# Patient Record
Sex: FEMALE | Race: WHITE | NOT HISPANIC OR LATINO | Employment: UNEMPLOYED | URBAN - METROPOLITAN AREA
[De-identification: names, ages, dates, MRNs, and addresses within clinical notes are randomized per-mention and may not be internally consistent; named-entity substitution may affect disease eponyms.]

---

## 2018-03-25 ENCOUNTER — HOSPITAL ENCOUNTER (INPATIENT)
Facility: HOSPITAL | Age: 33
LOS: 13 days | Discharge: LEFT BEFORE DISCHARGE | DRG: 871 | End: 2018-04-07
Attending: EMERGENCY MEDICINE | Admitting: HOSPITALIST
Payer: MEDICAID

## 2018-03-25 DIAGNOSIS — R78.81 BACTEREMIA: ICD-10-CM

## 2018-03-25 DIAGNOSIS — R50.9 FEVER: ICD-10-CM

## 2018-03-25 DIAGNOSIS — I38 ENDOCARDITIS: ICD-10-CM

## 2018-03-25 DIAGNOSIS — R78.81 GRAM-POSITIVE BACTEREMIA: ICD-10-CM

## 2018-03-25 DIAGNOSIS — A41.9 SEPSIS: Primary | ICD-10-CM

## 2018-03-25 DIAGNOSIS — F19.10 IV DRUG ABUSE: ICD-10-CM

## 2018-03-25 DIAGNOSIS — R50.9 FEVER, UNSPECIFIED FEVER CAUSE: ICD-10-CM

## 2018-03-25 DIAGNOSIS — F19.10: ICD-10-CM

## 2018-03-25 PROBLEM — R07.9 CHEST PAIN: Status: ACTIVE | Noted: 2018-03-25

## 2018-03-25 PROBLEM — F19.90 ACTIVE INTRAVENOUS DRUG USE: Status: ACTIVE | Noted: 2018-03-25

## 2018-03-25 PROBLEM — B19.20 HEPATITIS C: Status: ACTIVE | Noted: 2018-03-25

## 2018-03-25 LAB
ALBUMIN SERPL BCP-MCNC: 3.5 G/DL
ALLENS TEST: ABNORMAL
ALP SERPL-CCNC: 66 U/L
ALT SERPL W/O P-5'-P-CCNC: 21 U/L
AMPHET+METHAMPHET UR QL: NEGATIVE
ANION GAP SERPL CALC-SCNC: 11 MMOL/L
AST SERPL-CCNC: 23 U/L
B-HCG UR QL: NEGATIVE
B-HCG UR QL: NEGATIVE
BACTERIA #/AREA URNS AUTO: ABNORMAL /HPF
BARBITURATES UR QL SCN>200 NG/ML: NEGATIVE
BASOPHILS # BLD AUTO: 0.03 K/UL
BASOPHILS NFR BLD: 0.2 %
BENZODIAZ UR QL SCN>200 NG/ML: NEGATIVE
BILIRUB SERPL-MCNC: 0.9 MG/DL
BILIRUB UR QL STRIP: NEGATIVE
BUN SERPL-MCNC: 7 MG/DL
BZE UR QL SCN: NORMAL
CALCIUM SERPL-MCNC: 9.2 MG/DL
CANNABINOIDS UR QL SCN: NEGATIVE
CHLORIDE SERPL-SCNC: 97 MMOL/L
CLARITY UR REFRACT.AUTO: ABNORMAL
CO2 SERPL-SCNC: 23 MMOL/L
COLOR UR AUTO: YELLOW
CREAT SERPL-MCNC: 0.7 MG/DL
CREAT UR-MCNC: 96 MG/DL
CTP QC/QA: YES
CTP QC/QA: YES
DIASTOLIC DYSFUNCTION: NO
DIFFERENTIAL METHOD: ABNORMAL
EOSINOPHIL # BLD AUTO: 0 K/UL
EOSINOPHIL NFR BLD: 0 %
ERYTHROCYTE [DISTWIDTH] IN BLOOD BY AUTOMATED COUNT: 13.4 %
EST. GFR  (AFRICAN AMERICAN): >60 ML/MIN/1.73 M^2
EST. GFR  (NON AFRICAN AMERICAN): >60 ML/MIN/1.73 M^2
ESTIMATED PA SYSTOLIC PRESSURE: 24.16
FLUAV AG SPEC QL IA: NEGATIVE
FLUBV AG SPEC QL IA: NEGATIVE
GLUCOSE SERPL-MCNC: 116 MG/DL
GLUCOSE UR QL STRIP: NEGATIVE
HCO3 UR-SCNC: 29.5 MMOL/L (ref 24–28)
HCT VFR BLD AUTO: 36.8 %
HGB BLD-MCNC: 12.1 G/DL
HGB UR QL STRIP: NEGATIVE
IMM GRANULOCYTES # BLD AUTO: 0.11 K/UL
IMM GRANULOCYTES NFR BLD AUTO: 0.8 %
INR PPP: 1.1
KETONES UR QL STRIP: NEGATIVE
LACTATE SERPL-SCNC: 0.9 MMOL/L
LACTATE SERPL-SCNC: 0.9 MMOL/L
LDH SERPL L TO P-CCNC: 0.71 MMOL/L (ref 0.5–2.2)
LEUKOCYTE ESTERASE UR QL STRIP: NEGATIVE
LYMPHOCYTES # BLD AUTO: 0.8 K/UL
LYMPHOCYTES NFR BLD: 6.1 %
MCH RBC QN AUTO: 27.8 PG
MCHC RBC AUTO-ENTMCNC: 32.9 G/DL
MCV RBC AUTO: 84 FL
METHADONE UR QL SCN>300 NG/ML: NEGATIVE
MICROSCOPIC COMMENT: ABNORMAL
MONOCYTES # BLD AUTO: 0.5 K/UL
MONOCYTES NFR BLD: 3.9 %
NEUTROPHILS # BLD AUTO: 12.2 K/UL
NEUTROPHILS NFR BLD: 89 %
NITRITE UR QL STRIP: NEGATIVE
NRBC BLD-RTO: 0 /100 WBC
OPIATES UR QL SCN: NEGATIVE
PCO2 BLDA: 39.5 MMHG (ref 35–45)
PCP UR QL SCN>25 NG/ML: NEGATIVE
PH SMN: 7.48 [PH] (ref 7.35–7.45)
PH UR STRIP: 5 [PH] (ref 5–8)
PLATELET # BLD AUTO: 278 K/UL
PMV BLD AUTO: 9.4 FL
PO2 BLDA: 46 MMHG (ref 40–60)
POC BE: 6 MMOL/L
POC SATURATED O2: 85 % (ref 95–100)
POC TCO2: 31 MMOL/L (ref 24–29)
POTASSIUM SERPL-SCNC: 4.3 MMOL/L
PROCALCITONIN SERPL IA-MCNC: 17.87 NG/ML
PROT SERPL-MCNC: 8 G/DL
PROT UR QL STRIP: NEGATIVE
PROTHROMBIN TIME: 11.8 SEC
RBC # BLD AUTO: 4.36 M/UL
RBC #/AREA URNS AUTO: 3 /HPF (ref 0–4)
RETIRED EF AND QEF - SEE NOTES: 65 (ref 55–65)
SAMPLE: ABNORMAL
SITE: ABNORMAL
SODIUM SERPL-SCNC: 131 MMOL/L
SP GR UR STRIP: 1.01 (ref 1–1.03)
SPECIMEN SOURCE: NORMAL
SQUAMOUS #/AREA URNS AUTO: 27 /HPF
TOXICOLOGY INFORMATION: NORMAL
TRICUSPID VALVE REGURGITATION: NORMAL
URN SPEC COLLECT METH UR: ABNORMAL
UROBILINOGEN UR STRIP-ACNC: NEGATIVE EU/DL
WBC # BLD AUTO: 13.71 K/UL
WBC #/AREA URNS AUTO: 2 /HPF (ref 0–5)

## 2018-03-25 PROCEDURE — 83605 ASSAY OF LACTIC ACID: CPT

## 2018-03-25 PROCEDURE — 85610 PROTHROMBIN TIME: CPT

## 2018-03-25 PROCEDURE — 93010 ELECTROCARDIOGRAM REPORT: CPT | Mod: ,,, | Performed by: INTERNAL MEDICINE

## 2018-03-25 PROCEDURE — 85025 COMPLETE CBC W/AUTO DIFF WBC: CPT

## 2018-03-25 PROCEDURE — 93306 TTE W/DOPPLER COMPLETE: CPT

## 2018-03-25 PROCEDURE — 96368 THER/DIAG CONCURRENT INF: CPT

## 2018-03-25 PROCEDURE — 96366 THER/PROPH/DIAG IV INF ADDON: CPT

## 2018-03-25 PROCEDURE — 86703 HIV-1/HIV-2 1 RESULT ANTBDY: CPT

## 2018-03-25 PROCEDURE — 99291 CRITICAL CARE FIRST HOUR: CPT | Mod: ,,, | Performed by: EMERGENCY MEDICINE

## 2018-03-25 PROCEDURE — 86803 HEPATITIS C AB TEST: CPT

## 2018-03-25 PROCEDURE — 87147 CULTURE TYPE IMMUNOLOGIC: CPT | Mod: 59

## 2018-03-25 PROCEDURE — 87077 CULTURE AEROBIC IDENTIFY: CPT

## 2018-03-25 PROCEDURE — 99291 CRITICAL CARE FIRST HOUR: CPT | Mod: 25

## 2018-03-25 PROCEDURE — 86705 HEP B CORE ANTIBODY IGM: CPT

## 2018-03-25 PROCEDURE — 87400 INFLUENZA A/B EACH AG IA: CPT | Mod: 59

## 2018-03-25 PROCEDURE — 81025 URINE PREGNANCY TEST: CPT | Performed by: PHYSICIAN ASSISTANT

## 2018-03-25 PROCEDURE — 96365 THER/PROPH/DIAG IV INF INIT: CPT

## 2018-03-25 PROCEDURE — 93306 TTE W/DOPPLER COMPLETE: CPT | Mod: 26,,, | Performed by: INTERNAL MEDICINE

## 2018-03-25 PROCEDURE — 99232 SBSQ HOSP IP/OBS MODERATE 35: CPT | Mod: ,,, | Performed by: HOSPITALIST

## 2018-03-25 PROCEDURE — 84145 PROCALCITONIN (PCT): CPT

## 2018-03-25 PROCEDURE — 11000001 HC ACUTE MED/SURG PRIVATE ROOM

## 2018-03-25 PROCEDURE — 96375 TX/PRO/DX INJ NEW DRUG ADDON: CPT

## 2018-03-25 PROCEDURE — 25000003 PHARM REV CODE 250: Performed by: STUDENT IN AN ORGANIZED HEALTH CARE EDUCATION/TRAINING PROGRAM

## 2018-03-25 PROCEDURE — 63600175 PHARM REV CODE 636 W HCPCS: Performed by: PHYSICIAN ASSISTANT

## 2018-03-25 PROCEDURE — 87186 SC STD MICRODIL/AGAR DIL: CPT | Mod: 59

## 2018-03-25 PROCEDURE — 80053 COMPREHEN METABOLIC PANEL: CPT

## 2018-03-25 PROCEDURE — 25000003 PHARM REV CODE 250: Performed by: PHYSICIAN ASSISTANT

## 2018-03-25 PROCEDURE — 80307 DRUG TEST PRSMV CHEM ANLYZR: CPT

## 2018-03-25 PROCEDURE — 87040 BLOOD CULTURE FOR BACTERIA: CPT | Mod: 59

## 2018-03-25 PROCEDURE — 81001 URINALYSIS AUTO W/SCOPE: CPT

## 2018-03-25 PROCEDURE — 86706 HEP B SURFACE ANTIBODY: CPT

## 2018-03-25 PROCEDURE — 93005 ELECTROCARDIOGRAM TRACING: CPT

## 2018-03-25 PROCEDURE — 25000003 PHARM REV CODE 250: Performed by: FAMILY MEDICINE

## 2018-03-25 PROCEDURE — 82803 BLOOD GASES ANY COMBINATION: CPT

## 2018-03-25 PROCEDURE — 87522 HEPATITIS C REVRS TRNSCRPJ: CPT

## 2018-03-25 PROCEDURE — 96361 HYDRATE IV INFUSION ADD-ON: CPT

## 2018-03-25 RX ORDER — FENTANYL CITRATE 50 UG/ML
50 INJECTION, SOLUTION INTRAMUSCULAR; INTRAVENOUS
Status: COMPLETED | OUTPATIENT
Start: 2018-03-25 | End: 2018-03-25

## 2018-03-25 RX ORDER — CLONIDINE HYDROCHLORIDE 0.1 MG/1
0.1 TABLET ORAL EVERY 4 HOURS PRN
Status: DISCONTINUED | OUTPATIENT
Start: 2018-03-25 | End: 2018-04-07 | Stop reason: HOSPADM

## 2018-03-25 RX ORDER — VANCOMYCIN/0.9 % SOD CHLORIDE 1 G/100 ML
1000 PLASTIC BAG, INJECTION (ML) INTRAVENOUS EVERY 8 HOURS
Status: DISCONTINUED | OUTPATIENT
Start: 2018-03-26 | End: 2018-03-27

## 2018-03-25 RX ORDER — IBUPROFEN 400 MG/1
400 TABLET ORAL EVERY 6 HOURS PRN
Status: DISCONTINUED | OUTPATIENT
Start: 2018-03-25 | End: 2018-03-26

## 2018-03-25 RX ORDER — MUPIROCIN 20 MG/G
OINTMENT TOPICAL DAILY
Status: DISCONTINUED | OUTPATIENT
Start: 2018-03-26 | End: 2018-04-07 | Stop reason: HOSPADM

## 2018-03-25 RX ORDER — TIZANIDINE 4 MG/1
4 TABLET ORAL EVERY 8 HOURS PRN
Status: DISCONTINUED | OUTPATIENT
Start: 2018-03-25 | End: 2018-04-07 | Stop reason: HOSPADM

## 2018-03-25 RX ORDER — ONDANSETRON 2 MG/ML
8 INJECTION INTRAMUSCULAR; INTRAVENOUS EVERY 8 HOURS PRN
Status: DISCONTINUED | OUTPATIENT
Start: 2018-03-25 | End: 2018-03-27

## 2018-03-25 RX ORDER — ACETAMINOPHEN 325 MG/1
650 TABLET ORAL EVERY 6 HOURS PRN
Status: DISCONTINUED | OUTPATIENT
Start: 2018-03-25 | End: 2018-03-26

## 2018-03-25 RX ORDER — VANCOMYCIN 2 GRAM/500 ML IN 0.9 % SODIUM CHLORIDE INTRAVENOUS
2000
Status: COMPLETED | OUTPATIENT
Start: 2018-03-25 | End: 2018-03-25

## 2018-03-25 RX ORDER — ACETAMINOPHEN 500 MG
1000 TABLET ORAL
Status: COMPLETED | OUTPATIENT
Start: 2018-03-25 | End: 2018-03-25

## 2018-03-25 RX ORDER — KETOROLAC TROMETHAMINE 30 MG/ML
15 INJECTION, SOLUTION INTRAMUSCULAR; INTRAVENOUS
Status: COMPLETED | OUTPATIENT
Start: 2018-03-25 | End: 2018-03-25

## 2018-03-25 RX ORDER — LOPERAMIDE HYDROCHLORIDE 2 MG/1
2 CAPSULE ORAL 4 TIMES DAILY PRN
Status: DISCONTINUED | OUTPATIENT
Start: 2018-03-25 | End: 2018-04-07 | Stop reason: HOSPADM

## 2018-03-25 RX ADMIN — KETOROLAC TROMETHAMINE 15 MG: 30 INJECTION, SOLUTION INTRAMUSCULAR at 02:03

## 2018-03-25 RX ADMIN — SODIUM CHLORIDE 2040 ML: 0.9 INJECTION, SOLUTION INTRAVENOUS at 02:03

## 2018-03-25 RX ADMIN — PIPERACILLIN AND TAZOBACTAM 4.5 G: 4; .5 INJECTION, POWDER, LYOPHILIZED, FOR SOLUTION INTRAVENOUS; PARENTERAL at 03:03

## 2018-03-25 RX ADMIN — TIZANIDINE 4 MG: 2 TABLET ORAL at 10:03

## 2018-03-25 RX ADMIN — CLONIDINE HYDROCHLORIDE 0.1 MG: 0.1 TABLET ORAL at 10:03

## 2018-03-25 RX ADMIN — IBUPROFEN 400 MG: 400 TABLET, FILM COATED ORAL at 10:03

## 2018-03-25 RX ADMIN — FENTANYL CITRATE 50 MCG: 50 INJECTION, SOLUTION INTRAMUSCULAR; INTRAVENOUS at 02:03

## 2018-03-25 RX ADMIN — VANCOMYCIN HYDROCHLORIDE 2000 MG: 10 INJECTION, POWDER, LYOPHILIZED, FOR SOLUTION INTRAVENOUS at 04:03

## 2018-03-25 RX ADMIN — ACETAMINOPHEN 1000 MG: 500 TABLET ORAL at 02:03

## 2018-03-25 NOTE — HPI
33yoF with PMHx of reported Hep C and IVDU presents with 2 day hx of fevers with associated SOB, sharp reproducible chest pain radiating to back, myalgias, and one loose bowel movement. Patient claims she has had flu like symptoms before but this different. Patient claims her and her boyfriend live in a tent and he has similar symptoms. Patient says she has noticed areas of pus throughout her arms and has been picking at her face and says that seems infected. Patient denies nausea, vomiting, cough, or dysuria. Patient uses heroin daily and injects cocaine once weekly, last used heroin yesterday. Patient claims she learned she had Hep C from needle exchange clinic 6 mos ago and has never received treatment.

## 2018-03-25 NOTE — ED TRIAGE NOTES
Pt reports 103.0 fever today along with chest pain x2 days. Pt states she has been short of breath x1 day. Pt denies diaphoresis/cardiac history.

## 2018-03-25 NOTE — ED PROVIDER NOTES
Encounter Date: 3/25/2018       History     Chief Complaint   Patient presents with    Fever     fever, body aches, diarrhea, upper left chest wall swelling and tenderness, multiple lesions noted at different stages of healing to body.      32 y/o female with history of IV drug abuse x 6 years presents to the ER with chief complaint of fever and body aches x 2 days.  She reports chest and back pain, which is worse with deep breaths and movement.  She denies nausea, vomiting, but had 1 episode of loose brown stool yesterday. She has shortness of breath.  She has a frontal headache.  Her pain overall is rated 10/10.  She denies dysuria, urinary frequency, vaginal discharge, cough.  She took tylenol this morning, but has not taken anything in addition for her symptoms.    She last used IV heroin yesterday in her left arm.            Review of patient's allergies indicates:  No Known Allergies  History reviewed. No pertinent past medical history.  History reviewed. No pertinent surgical history.  History reviewed. No pertinent family history.  Social History   Substance Use Topics    Smoking status: Current Some Day Smoker     Packs/day: 0.50     Types: Cigarettes    Smokeless tobacco: Never Used    Alcohol use Not on file     Review of Systems   Constitutional: Positive for chills and fever.   HENT: Negative for sore throat.    Respiratory: Positive for shortness of breath. Negative for cough, chest tightness, wheezing and stridor.    Cardiovascular: Positive for chest pain. Negative for palpitations and leg swelling.   Gastrointestinal: Positive for diarrhea. Negative for abdominal pain, blood in stool, nausea and vomiting.   Genitourinary: Negative for dysuria.   Musculoskeletal: Positive for back pain and myalgias.   Skin: Positive for rash.   Neurological: Positive for headaches. Negative for dizziness, syncope, weakness and light-headedness.   Hematological: Does not bruise/bleed easily.    Psychiatric/Behavioral: Negative for confusion.       Physical Exam     Initial Vitals [03/25/18 1225]   BP Pulse Resp Temp SpO2   (!) 141/82 109 20 (!) 103.5 °F (39.7 °C) 99 %      MAP       101.67         Physical Exam    Nursing note and vitals reviewed.  Constitutional: She appears well-developed and well-nourished. She appears distressed (patient appears uncomfortable).   HENT:   Head: Atraumatic.   Mouth/Throat: Oropharynx is clear and moist.   Eyes: Conjunctivae and EOM are normal. Pupils are equal, round, and reactive to light.   Neck: Normal range of motion. Neck supple.   Cardiovascular: Normal rate, regular rhythm and intact distal pulses.   Pulmonary/Chest: Breath sounds normal. No respiratory distress. She has no wheezes. She has no rhonchi. She has no rales.   Abdominal: Soft. Bowel sounds are normal. There is no tenderness.   Neurological: She is alert and oriented to person, place, and time.   Skin: Capillary refill takes less than 2 seconds.   Multiple track marks and small skin pustules and sores involving bilateral arms.   Psychiatric: She has a normal mood and affect.         ED Course   Procedures  Labs Reviewed   CBC W/ AUTO DIFFERENTIAL - Abnormal; Notable for the following:        Result Value    WBC 13.71 (*)     Hematocrit 36.8 (*)     Immature Granulocytes 0.8 (*)     Gran # (ANC) 12.2 (*)     Immature Grans (Abs) 0.11 (*)     Lymph # 0.8 (*)     Gran% 89.0 (*)     Lymph% 6.1 (*)     Mono% 3.9 (*)     All other components within normal limits   COMPREHENSIVE METABOLIC PANEL - Abnormal; Notable for the following:     Sodium 131 (*)     Glucose 116 (*)     All other components within normal limits   PROCALCITONIN - Abnormal; Notable for the following:     Procalcitonin 17.87 (*)     All other components within normal limits   ISTAT PROCEDURE - Abnormal; Notable for the following:     POC PH 7.481 (*)     POC HCO3 29.5 (*)     POC SATURATED O2 85 (*)     POC TCO2 31 (*)     All other  components within normal limits   CULTURE, BLOOD   CULTURE, BLOOD   LACTIC ACID, PLASMA   INFLUENZA A AND B ANTIGEN   PROTIME-INR   URINALYSIS, REFLEX TO URINE CULTURE   LACTIC ACID, PLASMA   POCT URINE PREGNANCY                   APC / Resident Notes:   Patient presents to the ER with c/o fever,  body aches, chest pain and shortness of breath.  Patient is initially 103.5 temp and pulse of 109.  Sepsis protocol was initiated in the intake area and the patient was transferred to the main ED.  IV fluids, Tylenol and Toradol are given.  She is also given 1 dose of fentanyl in the ED.  On exam the patient has multiple track marks and skin lesions.  There is no obvious abscess or large area of cellulitis.  We have considered MRSA as the source of her infection.  She will also likely require LIZET to evaluate for possible endocarditis.    Patient is given IV vancomycin and zosyn in the ED.   Labs reveal WBC count 13.71, procalcitonin elevated to 17.87, normal lactic acid.    She will be admitted for continued treatment of sepsis.  Blood cultures are pending.    Patient is in agreement with plan for admission.       I discussed the care of this pt with my supervising MD and the patient was also seen by her.             Attending Attestation:             Attending ED Notes:   I have reviewed the case with my KENDRICK and agree with the history, review of systems, physical exam, assessment and plan of care as documented by my advance practice clinician.  ROS as above and as addressed on KENDRICK documentation.   All other systems reviewed and are negative.    PHYSICAL EXAM  Vital signs and nurse note reviewed.  Febrile    GEN:  NAD, sleeping but arousable, oriented ×3, atraumatic, well appearing, nontoxic  SKIN:               Warm, dry, multiple esposito crusting lesions on mouth, trunk and upper extremities  HEENT:   PERRLA, EOMI, nl conjunciva, no node/nodule, neck supple, no rigidity, dry membranes  CV:   2+ radial pulses, <2 sec cap  refill, no obvious JVD  CHEST/Resp: equal and bilateral chest rise, no obvious wheezing  ABD:     Soft, nondistended  EXT:   JEAN x 4, FROM, no swelling  NEURO:  CN II-XII grossly intact, no obvious motor/sensory deficit  PSYCH:  no SI/HI, no anxiety, nl mood/affect    Labs and imaging studies reviewed and independently Interpreted: Leukocytosis, elevated pro calcitonin, unremarkable BMP, unremarkable lactic acid, unremarkable urinary pregnancy test.  Chest x-ray with no acute opacities.    The results and physical exam findings were reviewed with the patient. Pt agrees with assessment, disposition and treatment plan and has no further questions or complaints at this time.    Critical care was necessary to treat or prevent imminent or life-threatening deterioration.   Critical care time: 31 min  Time spent at the bedside, reviewing test results, discussing the case with staff and/or consult(s), documenting the medical record and time spent with family members discussing treatment and management decisions.    The time involved in the performance of separately reportable procedures was not counted toward critical care time.               Clinical Impression:   The primary encounter diagnosis was Sepsis. Diagnoses of IV drug abuse, Track marks due to intravenous drug abuse, Fever, unspecified fever cause, Fever, and Endocarditis were also pertinent to this visit.    Disposition:   Disposition: Admitted  Condition: SURI Conde  03/25/18 6103

## 2018-03-25 NOTE — ASSESSMENT & PLAN NOTE
Reproducible on exam  EKG without STEMI or depressions  No other cardiac risk factors other than IV cocaine use intermittently; however denies use this week   Likely MSK, prn ibuprofen ordered

## 2018-03-25 NOTE — ASSESSMENT & PLAN NOTE
Patient everyday heroin user  Prn clonidine, loperamide, ibuprofen, and tizanidine for withdrawal symptoms  Likely consult addiction psych

## 2018-03-25 NOTE — SUBJECTIVE & OBJECTIVE
History reviewed. No pertinent past medical history.    History reviewed. No pertinent surgical history.    Review of patient's allergies indicates:  No Known Allergies    No current facility-administered medications on file prior to encounter.      No current outpatient prescriptions on file prior to encounter.     Family History     None        Social History Main Topics    Smoking status: Current Some Day Smoker     Packs/day: 0.50     Types: Cigarettes    Smokeless tobacco: Never Used    Alcohol use Not on file    Drug use: Yes     Frequency: 3.0 times per week     Types: IV      Comment: Heroin    Sexual activity: Not on file     Review of Systems   Constitutional: Positive for diaphoresis and fever.   Eyes: Negative for visual disturbance.   Respiratory: Positive for shortness of breath.    Cardiovascular: Positive for chest pain. Negative for palpitations and leg swelling.   Gastrointestinal: Positive for diarrhea. Negative for abdominal pain, nausea and vomiting.   Endocrine: Negative for polyuria.   Genitourinary: Negative for dysuria and hematuria.   Musculoskeletal: Positive for myalgias. Negative for arthralgias.   Skin: Positive for rash and wound.   Neurological: Positive for light-headedness. Negative for weakness, numbness and headaches.   Psychiatric/Behavioral: Negative for confusion.     Objective:     Vital Signs (Most Recent):  Temp: (!) 102.9 °F (39.4 °C) (03/25/18 1413)  Pulse: 89 (03/25/18 1601)  Resp: (!) 21 (03/25/18 1601)  BP: 115/60 (03/25/18 1601)  SpO2: 98 % (03/25/18 1601) Vital Signs (24h Range):  Temp:  [102.9 °F (39.4 °C)-103.5 °F (39.7 °C)] 102.9 °F (39.4 °C)  Pulse:  [] 89  Resp:  [18-21] 21  SpO2:  [98 %-99 %] 98 %  BP: (115-141)/(60-86) 115/60     Weight: 68 kg (150 lb)  Body mass index is 27.44 kg/m².    Physical Exam   Constitutional: She is oriented to person, place, and time. No distress.   HENT:   Mouth/Throat: Oropharynx is clear and moist.   Eyes: Conjunctivae  and EOM are normal. No scleral icterus.   Neck: Normal range of motion. No JVD present.   Cardiovascular: Normal rate, regular rhythm and normal heart sounds.    1/6 murmur LUSB   Pulmonary/Chest: Effort normal and breath sounds normal. She has no wheezes. She has no rales. She exhibits tenderness.   Abdominal: Soft. Bowel sounds are normal. She exhibits no distension. There is no tenderness.   Musculoskeletal: She exhibits no edema.   Neurological: She is alert and oriented to person, place, and time. No sensory deficit.   Skin: Skin is warm. She is diaphoretic.   Multiple pustule lesions on face, extremities and back. Face with scabbing effacing chin.   Psychiatric: She has a normal mood and affect.         CRANIAL NERVES     CN III, IV, VI   Extraocular motions are normal.        Significant Labs:   ABGs:   Recent Labs  Lab 03/25/18  1401   PH 7.481*   PCO2 39.5   HCO3 29.5*   POCSATURATED 85*   BE 6     BMP:   Recent Labs  Lab 03/25/18  1358   *   *   K 4.3   CL 97   CO2 23   BUN 7   CREATININE 0.7   CALCIUM 9.2     CBC:   Recent Labs  Lab 03/25/18  1358   WBC 13.71*   HGB 12.1   HCT 36.8*        Lactic Acid:   Recent Labs  Lab 03/25/18  1358   LACTATE 0.9     Procalcitonin 3/25   17.87    Significant Imaging: I have reviewed all pertinent imaging results/findings within the past 24 hours.

## 2018-03-25 NOTE — ED NOTES
Pt reassessed in triage: no change in pt status, AOx3, resp even/unlabored, skin w/d, generalized weakness,  plan of care discussed with patient and family to include: reassure and observe. No new complaints at this time,  NAD at this time, will continue to montior.

## 2018-03-25 NOTE — ASSESSMENT & PLAN NOTE
Patient with tachycardia and fever up to 103.5 in setting of IVDU also has multiple pustules on exam and facial lesion consistent with impetigo  Procal 17.89, leukocytosis 13.71  Ddx includes IE vs GPC bacteremia vs HIV  Currently CXR not consistent with PNA, influenza negative  Blood Cx x 2 drawn in the Ed, follow cultures  Given 30cc/kg zosyn and vanc in the ED  Rocephin and vanc on the floor, Bactroban for impetigo  TTE ordered  HIV testing ordered

## 2018-03-25 NOTE — H&P
[facility] Hospital Medicine  History & Physical    Patient Name: Kimberlee Ling  MRN: [id number]  Admission Date: 3/25/2018  Attending Physician: Kyrie Gonzalez MD   Primary Care Provider: [facility] Medicine Team: [facility] HOSP MED 4 Maribell Segundo MD     Patient information was obtained from patient and ER records.     Subjective:     Principal Problem:Sepsis    Chief Complaint:   Chief Complaint   Patient presents with    Fever     fever, body aches, diarrhea, upper left chest wall swelling and tenderness, multiple lesions noted at different stages of healing to body.         HPI: 33yoF with PMHx of reported Hep C and IVDU presents with 2 day hx of fevers with associated SOB, sharp reproducible chest pain radiating to back, myalgias, and one loose bowel movement. Patient claims she has had flu like symptoms before but this different. Patient claims her and her boyfriend live in a tent and he has similar symptoms. Patient says she has noticed areas of pus throughout her arms and has been picking at her face and says that seems infected. Patient denies nausea, vomiting, cough, or dysuria. Patient uses heroin daily and injects cocaine once weekly, last used heroin yesterday. Patient claims she learned she had Hep C from needle exchange clinic 6 mos ago and has never received treatment.    History reviewed. No pertinent past medical history.    History reviewed. No pertinent surgical history.    Review of patient's allergies indicates:  No Known Allergies    No current facility-administered medications on file prior to encounter.      No current outpatient prescriptions on file prior to encounter.     Family History     None        Social History Main Topics    Smoking status: Current Some Day Smoker     Packs/day: 0.50     Types: Cigarettes    Smokeless tobacco: Never Used    Alcohol use Not on file    Drug use: Yes     Frequency: 3.0 times per week     Types: IV       Comment: Heroin    Sexual activity: Not on file     Review of Systems   Constitutional: Positive for diaphoresis and fever.   Eyes: Negative for visual disturbance.   Respiratory: Positive for shortness of breath.    Cardiovascular: Positive for chest pain. Negative for palpitations and leg swelling.   Gastrointestinal: Positive for diarrhea. Negative for abdominal pain, nausea and vomiting.   Endocrine: Negative for polyuria.   Genitourinary: Negative for dysuria and hematuria.   Musculoskeletal: Positive for myalgias. Negative for arthralgias.   Skin: Positive for rash and wound.   Neurological: Positive for light-headedness. Negative for weakness, numbness and headaches.   Psychiatric/Behavioral: Negative for confusion.     Objective:     Vital Signs (Most Recent):  Temp: (!) 102.9 °F (39.4 °C) (03/25/18 1413)  Pulse: 89 (03/25/18 1601)  Resp: (!) 21 (03/25/18 1601)  BP: 115/60 (03/25/18 1601)  SpO2: 98 % (03/25/18 1601) Vital Signs (24h Range):  Temp:  [102.9 °F (39.4 °C)-103.5 °F (39.7 °C)] 102.9 °F (39.4 °C)  Pulse:  [] 89  Resp:  [18-21] 21  SpO2:  [98 %-99 %] 98 %  BP: (115-141)/(60-86) 115/60     Weight: 68 kg (150 lb)  Body mass index is 27.44 kg/m².    Physical Exam   Constitutional: She is oriented to person, place, and time. No distress.   HENT:   Mouth/Throat: Oropharynx is clear and moist.   Eyes: Conjunctivae and EOM are normal. No scleral icterus.   Neck: Normal range of motion. No JVD present.   Cardiovascular: Normal rate, regular rhythm and normal heart sounds.    1/6 murmur LUSB   Pulmonary/Chest: Effort normal and breath sounds normal. She has no wheezes. She has no rales. She exhibits tenderness.   Abdominal: Soft. Bowel sounds are normal. She exhibits no distension. There is no tenderness.   Musculoskeletal: She exhibits no edema.   Neurological: She is alert and oriented to person, place, and time. No sensory deficit.   Skin: Skin is warm. She is diaphoretic.   Multiple pustule  lesions on face, extremities and back. Face with scabbing effacing chin.   Psychiatric: She has a normal mood and affect.         CRANIAL NERVES     CN III, IV, VI   Extraocular motions are normal.        Significant Labs:   ABGs:   Recent Labs  Lab 03/25/18  1401   PH 7.481*   PCO2 39.5   HCO3 29.5*   POCSATURATED 85*   BE 6     BMP:   Recent Labs  Lab 03/25/18  1358   *   *   K 4.3   CL 97   CO2 23   BUN 7   CREATININE 0.7   CALCIUM 9.2     CBC:   Recent Labs  Lab 03/25/18  1358   WBC 13.71*   HGB 12.1   HCT 36.8*        Lactic Acid:   Recent Labs  Lab 03/25/18  1358   LACTATE 0.9     Procalcitonin 3/25   17.87    Significant Imaging: I have reviewed all pertinent imaging results/findings within the past 24 hours.    Assessment/Plan:     * Sepsis    Patient with tachycardia and fever up to 103.5 in setting of IVDU also has multiple pustules on exam and facial lesion consistent with impetigo  Procal 17.89, leukocytosis 13.71  Ddx includes IE vs GPC bacteremia vs HIV  Currently CXR not consistent with PNA, influenza negative  Blood Cx x 2 drawn in the Ed, follow cultures  Given 30cc/kg zosyn and vanc in the ED  Rocephin and vanc on the floor, Bactroban for impetigo  TTE ordered  HIV testing ordered         Active intravenous drug use    Patient everyday heroin user  Prn clonidine, loperamide, ibuprofen, and tizanidine for withdrawal symptoms  Likely consult addiction psych          Chest pain    Reproducible on exam  EKG without STEMI or depressions  No other cardiac risk factors other than IV cocaine use intermittently; however denies use this week   Likely MSK, prn ibuprofen ordered          Hepatitis C    Reported per patient who does not have medical care, patient discovered 6 mos ago from needle exchange service; not currently being treated  Will order chronic hepatitis labs including Hep C RNA            VTE Risk Mitigation     None             Maribell Segundo MD  Department of Hospital  Medicine   [facility]

## 2018-03-26 LAB
ALBUMIN SERPL BCP-MCNC: 2.7 G/DL
ALP SERPL-CCNC: 60 U/L
ALT SERPL W/O P-5'-P-CCNC: 17 U/L
ANION GAP SERPL CALC-SCNC: 7 MMOL/L
AST SERPL-CCNC: 19 U/L
BASOPHILS # BLD AUTO: 0.02 K/UL
BASOPHILS NFR BLD: 0.2 %
BILIRUB SERPL-MCNC: 0.4 MG/DL
BUN SERPL-MCNC: 11 MG/DL
CALCIUM SERPL-MCNC: 8.7 MG/DL
CHLORIDE SERPL-SCNC: 104 MMOL/L
CO2 SERPL-SCNC: 26 MMOL/L
CREAT SERPL-MCNC: 1 MG/DL
DIFFERENTIAL METHOD: ABNORMAL
EOSINOPHIL # BLD AUTO: 0 K/UL
EOSINOPHIL NFR BLD: 0 %
ERYTHROCYTE [DISTWIDTH] IN BLOOD BY AUTOMATED COUNT: 13.8 %
EST. GFR  (AFRICAN AMERICAN): >60 ML/MIN/1.73 M^2
EST. GFR  (NON AFRICAN AMERICAN): >60 ML/MIN/1.73 M^2
GLUCOSE SERPL-MCNC: 116 MG/DL
HCT VFR BLD AUTO: 33.3 %
HGB BLD-MCNC: 10.6 G/DL
HIV 1+2 AB+HIV1 P24 AG SERPL QL IA: NEGATIVE
IMM GRANULOCYTES # BLD AUTO: 0.04 K/UL
IMM GRANULOCYTES NFR BLD AUTO: 0.5 %
LYMPHOCYTES # BLD AUTO: 0.8 K/UL
LYMPHOCYTES NFR BLD: 10 %
MAGNESIUM SERPL-MCNC: 2 MG/DL
MCH RBC QN AUTO: 27.3 PG
MCHC RBC AUTO-ENTMCNC: 31.8 G/DL
MCV RBC AUTO: 86 FL
MONOCYTES # BLD AUTO: 0.6 K/UL
MONOCYTES NFR BLD: 6.8 %
NEUTROPHILS # BLD AUTO: 6.7 K/UL
NEUTROPHILS NFR BLD: 82.5 %
NRBC BLD-RTO: 0 /100 WBC
PHOSPHATE SERPL-MCNC: 1.9 MG/DL
PLATELET # BLD AUTO: 209 K/UL
PLATELET BLD QL SMEAR: ABNORMAL
PMV BLD AUTO: 9.5 FL
POTASSIUM SERPL-SCNC: 4.5 MMOL/L
PROT SERPL-MCNC: 6.4 G/DL
RBC # BLD AUTO: 3.88 M/UL
SODIUM SERPL-SCNC: 137 MMOL/L
WBC # BLD AUTO: 8.09 K/UL

## 2018-03-26 PROCEDURE — 85025 COMPLETE CBC W/AUTO DIFF WBC: CPT

## 2018-03-26 PROCEDURE — 11000001 HC ACUTE MED/SURG PRIVATE ROOM

## 2018-03-26 PROCEDURE — 25000003 PHARM REV CODE 250: Performed by: HOSPITALIST

## 2018-03-26 PROCEDURE — 25000003 PHARM REV CODE 250: Performed by: STUDENT IN AN ORGANIZED HEALTH CARE EDUCATION/TRAINING PROGRAM

## 2018-03-26 PROCEDURE — 83735 ASSAY OF MAGNESIUM: CPT

## 2018-03-26 PROCEDURE — 80053 COMPREHEN METABOLIC PANEL: CPT

## 2018-03-26 PROCEDURE — 25000003 PHARM REV CODE 250: Performed by: PSYCHIATRY & NEUROLOGY

## 2018-03-26 PROCEDURE — 36415 COLL VENOUS BLD VENIPUNCTURE: CPT

## 2018-03-26 PROCEDURE — 84100 ASSAY OF PHOSPHORUS: CPT

## 2018-03-26 PROCEDURE — 87040 BLOOD CULTURE FOR BACTERIA: CPT

## 2018-03-26 PROCEDURE — 99223 1ST HOSP IP/OBS HIGH 75: CPT | Mod: ,,, | Performed by: PSYCHIATRY & NEUROLOGY

## 2018-03-26 PROCEDURE — 99232 SBSQ HOSP IP/OBS MODERATE 35: CPT | Mod: ,,, | Performed by: HOSPITALIST

## 2018-03-26 PROCEDURE — 63600175 PHARM REV CODE 636 W HCPCS: Performed by: STUDENT IN AN ORGANIZED HEALTH CARE EDUCATION/TRAINING PROGRAM

## 2018-03-26 PROCEDURE — 25000003 PHARM REV CODE 250: Performed by: INTERNAL MEDICINE

## 2018-03-26 RX ORDER — METHADONE HYDROCHLORIDE 10 MG/1
10 TABLET ORAL ONCE
Status: COMPLETED | OUTPATIENT
Start: 2018-03-26 | End: 2018-03-26

## 2018-03-26 RX ORDER — METHADONE HYDROCHLORIDE 10 MG/1
10 TABLET ORAL NIGHTLY PRN
Status: COMPLETED | OUTPATIENT
Start: 2018-03-26 | End: 2018-03-26

## 2018-03-26 RX ORDER — ACETAMINOPHEN 325 MG/1
650 TABLET ORAL EVERY 6 HOURS PRN
Status: DISCONTINUED | OUTPATIENT
Start: 2018-03-26 | End: 2018-04-07 | Stop reason: HOSPADM

## 2018-03-26 RX ORDER — METHADONE HYDROCHLORIDE 10 MG/1
10 TABLET ORAL DAILY
Status: COMPLETED | OUTPATIENT
Start: 2018-03-27 | End: 2018-03-27

## 2018-03-26 RX ORDER — KETOROLAC TROMETHAMINE 10 MG/1
10 TABLET, FILM COATED ORAL ONCE
Status: COMPLETED | OUTPATIENT
Start: 2018-03-26 | End: 2018-03-26

## 2018-03-26 RX ORDER — SODIUM,POTASSIUM PHOSPHATES 280-250MG
1 POWDER IN PACKET (EA) ORAL
Status: COMPLETED | OUTPATIENT
Start: 2018-03-26 | End: 2018-03-26

## 2018-03-26 RX ORDER — KETOROLAC TROMETHAMINE 15 MG/ML
15 INJECTION, SOLUTION INTRAMUSCULAR; INTRAVENOUS ONCE
Status: COMPLETED | OUTPATIENT
Start: 2018-03-26 | End: 2018-03-26

## 2018-03-26 RX ORDER — LIDOCAINE 50 MG/G
1 PATCH TOPICAL ONCE
Status: COMPLETED | OUTPATIENT
Start: 2018-03-26 | End: 2018-03-27

## 2018-03-26 RX ORDER — IBUPROFEN 400 MG/1
400 TABLET ORAL EVERY 6 HOURS PRN
Status: DISCONTINUED | OUTPATIENT
Start: 2018-03-26 | End: 2018-03-30

## 2018-03-26 RX ADMIN — METHADONE HYDROCHLORIDE 10 MG: 10 TABLET ORAL at 02:03

## 2018-03-26 RX ADMIN — Medication 1 G: at 06:03

## 2018-03-26 RX ADMIN — IBUPROFEN 400 MG: 400 TABLET, FILM COATED ORAL at 05:03

## 2018-03-26 RX ADMIN — MUPIROCIN: 20 OINTMENT TOPICAL at 09:03

## 2018-03-26 RX ADMIN — CEFTRIAXONE SODIUM 2 G: 2 INJECTION, POWDER, FOR SOLUTION INTRAMUSCULAR; INTRAVENOUS at 09:03

## 2018-03-26 RX ADMIN — LOPERAMIDE HYDROCHLORIDE 2 MG: 2 CAPSULE ORAL at 04:03

## 2018-03-26 RX ADMIN — POTASSIUM & SODIUM PHOSPHATES POWDER PACK 280-160-250 MG 1 PACKET: 280-160-250 PACK at 05:03

## 2018-03-26 RX ADMIN — METHADONE HYDROCHLORIDE 10 MG: 10 TABLET ORAL at 09:03

## 2018-03-26 RX ADMIN — POTASSIUM & SODIUM PHOSPHATES POWDER PACK 280-160-250 MG 1 PACKET: 280-160-250 PACK at 09:03

## 2018-03-26 RX ADMIN — IBUPROFEN 400 MG: 400 TABLET, FILM COATED ORAL at 04:03

## 2018-03-26 RX ADMIN — IBUPROFEN 400 MG: 400 TABLET, FILM COATED ORAL at 09:03

## 2018-03-26 RX ADMIN — POTASSIUM & SODIUM PHOSPHATES POWDER PACK 280-160-250 MG 1 PACKET: 280-160-250 PACK at 11:03

## 2018-03-26 RX ADMIN — Medication 1 G: at 01:03

## 2018-03-26 RX ADMIN — KETOROLAC TROMETHAMINE 15 MG: 15 INJECTION, SOLUTION INTRAMUSCULAR; INTRAVENOUS at 09:03

## 2018-03-26 RX ADMIN — KETOROLAC TROMETHAMINE 10 MG: 10 TABLET, FILM COATED ORAL at 09:03

## 2018-03-26 RX ADMIN — TIZANIDINE 4 MG: 2 TABLET ORAL at 04:03

## 2018-03-26 RX ADMIN — TIZANIDINE 4 MG: 2 TABLET ORAL at 05:03

## 2018-03-26 RX ADMIN — CLONIDINE HYDROCHLORIDE 0.1 MG: 0.1 TABLET ORAL at 04:03

## 2018-03-26 NOTE — ASSESSMENT & PLAN NOTE
Patient everyday heroin user  Prn clonidine, loperamide, ibuprofen, and tizanidine for withdrawal symptoms  Consult to addiction psych

## 2018-03-26 NOTE — ASSESSMENT & PLAN NOTE
Patient with tachycardia and fever up to 103.5 in setting of IVDU also has multiple pustules on exam and facial lesion consistent with impetigo  Procal 17.89, leukocytosis 13.71  Ddx includes IE vs GPC bacteremia  Currently CXR not consistent with PNA, influenza negative  Blood Cx x 2 drawn in the Ed, 2/2 bottles growing strep, sens pending, repeated cultures 3/26  Given 30cc/kg zosyn and vanc in the ED  Rocephin and vanc on the floor, Bactroban for impetigo  TTE ordered, no vegetations, ordered LIZET  HIV testing ordered

## 2018-03-26 NOTE — PROGRESS NOTES
[facility] Hospital Medicine  Progress Note    Patient Name: Kimberlee Ling  MRN: [id number]  Patient Class: IP- Inpatient   Admission Date: 3/25/2018  Length of Stay: 1 days  Attending Physician: Corinne Perry MD  Primary Care Provider: Primary Doctor [facility] Medicine Team: [facility]OSP MED 4 Maribell Segundo MD    Subjective:     Principal Problem:Sepsis    HPI:  33yoF with PMHx of reported Hep C and IVDU presents with 2 day hx of fevers with associated SOB, sharp reproducible chest pain radiating to back, myalgias, and one loose bowel movement. Patient claims she has had flu like symptoms before but this different. Patient claims her and her boyfriend live in a tent and he has similar symptoms. Patient says she has noticed areas of pus throughout her arms and has been picking at her face and says that seems infected. Patient denies nausea, vomiting, cough, or dysuria. Patient uses heroin daily and injects cocaine once weekly, last used heroin yesterday. Patient claims she learned she had Hep C from needle exchange clinic 6 mos ago and has never received treatment.    Hospital Course:  Admitted to hospital medicine started on vanc and rocephin. 3/26 2/2 bottles growing strep. Obtaining LIZET.      Interval Hx: Patient with subjective fevers last fever 3/25 at 2:13pm 102.9. No episodes of diarrhea overnight. 2/2 bottles growing strep. Obtaining LIZET.    Review of Systems   Constitutional: Positive for diaphoresis and fever.   Eyes: Negative for visual disturbance.   Respiratory: Negative for shortness of breath.    Cardiovascular: Positive for chest pain. Negative for palpitations and leg swelling.   Gastrointestinal: Negative for abdominal pain, diarrhea, nausea and vomiting.   Endocrine: Negative for polyuria.   Genitourinary: Negative for dysuria and hematuria.   Musculoskeletal: Positive for myalgias. Negative for arthralgias.   Skin: Positive for rash and wound.   Neurological:  Negative for weakness, numbness and headaches.   Psychiatric/Behavioral: Negative for confusion.     Objective:     Vital Signs (Most Recent):  Temp: 99.4 °F (37.4 °C) (03/26/18 0747)  Pulse: 75 (03/26/18 0841)  Resp: 18 (03/26/18 0747)  BP: 114/61 (03/26/18 0747)  SpO2: 97 % (03/26/18 0747) Vital Signs (24h Range):  Temp:  [98.2 °F (36.8 °C)-103.5 °F (39.7 °C)] 99.4 °F (37.4 °C)  Pulse:  [] 75  Resp:  [18-21] 18  SpO2:  [93 %-99 %] 97 %  BP: (114-141)/(60-86) 114/61     Weight: 68 kg (150 lb)  Body mass index is 27.44 kg/m².    Physical Exam   Constitutional: She is oriented to person, place, and time. No distress.   HENT:   Mouth/Throat: Oropharynx is clear and moist.   Eyes: Conjunctivae and EOM are normal. No scleral icterus.   Neck: Normal range of motion. No JVD present.   Cardiovascular: Normal rate, regular rhythm and normal heart sounds.    1/6 murmur LUSB   Pulmonary/Chest: Effort normal and breath sounds normal. She has no wheezes. She has no rales. She exhibits tenderness.   Abdominal: Soft. Bowel sounds are normal. She exhibits no distension. There is no tenderness.   Musculoskeletal: She exhibits no edema.   Neurological: She is alert and oriented to person, place, and time. No sensory deficit.   Skin: Skin is warm. She is diaphoretic.   Multiple pustule lesions and small areas of scabbing on face, extremities and back. Face with scabbing effacing chin.   Psychiatric: She has a normal mood and affect.         CRANIAL NERVES     CN III, IV, VI   Extraocular motions are normal.        Significant Labs:   ABGs:     Recent Labs  Lab 03/25/18  1401   PH 7.481*   PCO2 39.5   HCO3 29.5*   POCSATURATED 85*   BE 6     BMP:     Recent Labs  Lab 03/26/18  0404   *      K 4.5      CO2 26   BUN 11   CREATININE 1.0   CALCIUM 8.7   MG 2.0     CBC:     Recent Labs  Lab 03/25/18  1358 03/26/18  0404   WBC 13.71* 8.09   HGB 12.1 10.6*   HCT 36.8* 33.3*    209     Lactic Acid:     Recent  Labs  Lab 03/25/18  1358 03/25/18  1829   LACTATE 0.9 0.9     Procalcitonin 3/25   17.87    Significant Imaging: I have reviewed all pertinent imaging results/findings within the past 24 hours.    Assessment/Plan:      * Sepsis    Patient with tachycardia and fever up to 103.5 in setting of IVDU also has multiple pustules on exam and facial lesion consistent with impetigo  Procal 17.89, leukocytosis 13.71  Ddx includes IE vs GPC bacteremia  Currently CXR not consistent with PNA, influenza negative  Blood Cx x 2 drawn in the Ed, 2/2 bottles growing strep, sens pending, repeated cultures 3/26  Given 30cc/kg zosyn and vanc in the ED  Rocephin and vanc on the floor, Bactroban for impetigo  TTE ordered, no vegetations, ordered LIZET  HIV testing ordered         Active intravenous drug use    Patient everyday heroin user  Prn clonidine, loperamide, ibuprofen, and tizanidine for withdrawal symptoms  Consult to addiction psych          Chest pain    Reproducible on exam  EKG without STEMI or depressions  No other cardiac risk factors other than IV cocaine use intermittently; however denies use this week   Likely MSK, prn ibuprofen ordered          Hepatitis C    Reported per patient who does not have medical care, patient discovered 6 mos ago from needle exchange service; not currently being treated  Will order chronic hepatitis labs including Hep C RNA            VTE Risk Mitigation         Ordered     IP VTE LOW RISK PATIENT  Once      03/25/18 1900              Maribell Segundo MD  Department of Hospital Medicine   [facility]

## 2018-03-26 NOTE — ASSESSMENT & PLAN NOTE
Reported per patient who does not have medical care, patient discovered 6 mos ago from needle exchange service; not currently being treated  Will order chronic hepatitis labs including Hep C RNA

## 2018-03-26 NOTE — PLAN OF CARE
Problem: Patient Care Overview  Goal: Plan of Care Review  Outcome: Ongoing (interventions implemented as appropriate)  Patient is AAOx4. Vital signs stable. No falls throughout shift. Pain complains of pain to back and chest. One time dose of IV toradol administered. Patient started on methadone. Plan for LIZET tomorrow.

## 2018-03-26 NOTE — ED NOTES
Received pt AAO and in NAD.  Pt breathing E/U on room air.  Call bell in reach with bed rails up x2.  Pt placed on continuous cardiac, O2, and BP monitoring.  Pt and family advised of plan of care to include all test and procedures.  Will continue to monitor.

## 2018-03-26 NOTE — PLAN OF CARE
Problem: Patient Care Overview  Goal: Plan of Care Review  Outcome: Ongoing (interventions implemented as appropriate)  Report received, care assumed. Patient arrived on the unit via stretcher per self, cooperative but somewhat disconnected, keeping head under covers etc. Providing single word answers but being cooperative. Asks multiple times if her boyfriend can be placed in the next bed. Unit rules and routines explained, food provided and got her settled in. Safety:  call light in reach, patient oriented to room & instructed how to notify nurse if assistance is needed, questions & concerns addressed, bed in lowest position with wheels locked & side rails up X 2, fall precautions followed, patient free from fall & injury thus far this shift;  VTE/bleeding precautions maintained.  Activity:  patient up with assistance, weight shifted at least every other hour.  Neurological:  patient A&O X 4, follows commands, equal  strength & dorsi/plantarflexion, neuro checks performed & WDL.  Respiratory:  patient tolerates room air without distress, denies SOB.  Cardiac:  Denies chest pain, BP stable.  HR stable.  NSR on telemetry.  Afebrile this shift.  GI:  Patient tolerates PO intake well, denies nausea, LBM 3/24/18.  :  patient voids clear yellow urine without foul odor spontaneously & without difficulty, adequate output for shift.  Skin:  CDI.  Devices:  PIV CDI, negative for s/sx of infection & infiltration.  Pain:  Patient's pain fairly well controlled w ordered motrin, she wants narcotic pain meds but MDs are refusing  .  Will continue to monitor patient.

## 2018-03-26 NOTE — CONSULTS
3/26/2018 11:38 AM  Kimberlee Ling  1985  [id number]    Addiction Psychiatry Consult Note    Consult Requested By: Corinne Perry MD    Chief Complaint / Reason for Consult:     substance abuse     Assessment:     Kimberlee Ling is a 33 y.o. female with a history of Hep C and IV Drug Use who presented to [facility] due to 2 day history of fevers. Psychiatry was consulted to address the patient's symptoms of Opiate withdrawal and to consider Opiate Replacement therapy.    Impression:  Opiate use disorder, Severe    Recommendations:     · Initiate Methadone 10 mg daily, Initiate Methadon 10 mg QHS PRN (EKG WNL)  · Recommend referral to residential rehab  · Consult case management with assistance in residential rehab placement  · Addiction psychiatry will continue to follow patient.    Case discussed with staff psychiatrist, Suma Fallon MD.    Subjective:     History of Present Illness:   Kimberlee Ling is a 33 y.o. female with a history of Hep C and IV Drug Use who presented [place] [facility] due to 2 day history of fevers. Psychiatry was consulted to address the patient's symptoms of Opiate withdrawal and to consider Opiate Replacement therapy.    Patient reports that she was admitted to the hospital due to 2 days of fevers. She has been using IV Heroin for the past 6 years. She was initially buying Pain pills on the street prior to using IV drugs. She reports that she has been using 1-2 grams daily since then. She denies any history of going to rehab but she has been to detox at [facility] in the past. She reports one accidental overdose in the past. Patient also reports using prescription benzodiazepines and drinking alcohol heavily in the past however she stopped using these substances when she started using opiates. She has never used any opiate replacement therapies in the past however she is interested in anything that will help her with withdrawal symptoms.    Patient denies any psychiatric  history. Patient has never tried to harm herself or been on any psychiatric medications in the past.     At this time the patient reports pain, nausea, and chills which are similar to her previous episodes of withdrawal. She reports that she is amenable to residential rehab after discharge however she would like to go to a program that her boyfriend who also uses with her can go to as well. She denies SI/HI/AVH.    Collateral:   None provided      Psychiatric Review Of Systems:  Denies symptoms of depression, psychosis, or nilsa    Allergies:  Patient has no known allergies.    Substance Abuse History:  Substance of Choice: Heroin and Cocaine  Substances Used: monthly intravenous cocaine, daily intravenous heroin 1-2 grams  History of IVDU?: Yes   Use of Alcohol: Yes - occasional   Tobacco: No  History of Withdrawals: Yes   History of Detox: Yes   Rehab History: No  Patient aware of biomedical complications? Yes    Abuse Criteria:  Failure at work, school or home:  Yes   Use when physically hazardous:  Yes   Legal Problems:  Yes - 2017 incarceration   Use despite recurrent social/interpersonal problems:  Yes     Substance Use Disorder Criteria:  1. Often take in larger amounts or over a longer period of time than was intended: Yes  2. Persistent desire or unsuccessful efforts to cut down or control use: Yes  3. Great deal of time spent in activities necessary to obtain substance, use, or recover from effects: Yes  4. Craving/strong desire for substance or urge to use: Yes  5. Use resulting in failure to fulfill major role obligations at home, work or school: Yes  6. Social, occupational, recreational activities decreased because of use: Yes  7. Continued use despite having persistent or recurrent social or interpersonal problems cause or exaserbated by the substance: Yes  8. Recurrent use in situations in which it is physically hazardous: Yes  9. Use despite physical or psychological problems that are likely to have  been caused or exacerbated by the substance: Yes   10. Tolerance: Yes   11. Withdrawal: Yes  Mild (1-3), Moderate (4-5), Severe (?6)    Medical/Surgical History:  History reviewed. No pertinent past medical history.  History reviewed. No pertinent surgical history.    Psychiatric History:  Previous Medication Trials: no   Previous Psychiatric Hospitalizations: no   Previous Suicide Attempts: no   History of Violence: no  Outpatient Psychiatrist: no    Social History:  Marital Status: not   Children: 3   Employment Status: Unemployed  Education: technical college  Special Ed: no   history: No  Housing Status: Homeless  Financial status: Pan handling   Leisure/recreation: Drugs  Childhood history: Uneventful  History of abuse: no  Access to gun: no, recently sold guns for drugs    Legal History:  Past Charges/Incarcerations: yes   Pending charges: no     Family Psychiatric History:   Brother, schizophrenia and Aunt with Alcohol use Disorder    Objective:     Current Medications:  Infusions:    Scheduled:   cefTRIAXone (ROCEPHIN) IVPB  2 g Intravenous Q24H    mupirocin   Topical (Top) Daily    potassium, sodium phosphates  1 packet Oral QID (AC & HS)    vancomycin (VANCOCIN) IVPB  1,000 mg Intravenous Q8H     PRN:  acetaminophen, cloNIDine, ibuprofen, loperamide, ondansetron, tiZANidine    Home Medications:  Prior to Admission medications    Not on File     Vital Signs:  Temp:  [98.2 °F (36.8 °C)-103.5 °F (39.7 °C)]   Pulse:  []   Resp:  [18-21]   BP: (114-141)/(60-86)   SpO2:  [93 %-99 %]     Physical Exam:  Gen: Alert, calm, cooperative, NAD   HEENT: NCAT, PERRL, EOMI, MMM   Lungs: CTAB, respirations unlabored   Chest wall: No tenderness or deformity   Heart: RRR, S1/S2 normal, no M/R/G   Abdomen: S/NT/ND, +BS, no HSM, no masses   Extremities: Extremities normal, atraumatic, no cyanosis or edema   Pulses: 2+ and symmetric all extremities   Skin: Skin color, texture, turgor normal; no rashes  or lesions   Neurologic: CN II-XII grossly intact, normal strength, sensations and reflexes throughout     Mental Status Exam:  Appearance: unremarkable, age appropriate   Behavior: normal, cooperative   Speech/Language: normal tone, normal rate, normal pitch, normal volume   Mood: euthymic   Affect: mood congruent   Thought Process:  normal and logical   Thought Content: normal, no suicidality, no homicidality, delusions, or paranoia   Orientation: grossly intact   Cognition: grossly intact   Insight: fair   Judgment: poor     Laboratory Data:  Recent Results (from the past 48 hour(s))   Blood culture x two cultures. Draw prior to antibiotics.    Collection Time: 03/25/18  1:42 PM   Result Value Ref Range    Blood Culture, Routine       Gram stain vivian bottle: Gram positive cocci in chains resembling Strep     Blood Culture, Routine       Results called to and read back by: Usha Mckeon RN  03/26/2018      Blood Culture, Routine 02:12     Blood Culture, Routine       Gram stain aer bottle: Gram positive cocci in chains resembling Strep     Blood Culture, Routine 03/26/2018  03:28    CBC auto differential    Collection Time: 03/25/18  1:58 PM   Result Value Ref Range    WBC 13.71 (H) 3.90 - 12.70 K/uL    RBC 4.36 4.00 - 5.40 M/uL    Hemoglobin 12.1 12.0 - 16.0 g/dL    Hematocrit 36.8 (L) 37.0 - 48.5 %    MCV 84 82 - 98 fL    MCH 27.8 27.0 - 31.0 pg    MCHC 32.9 32.0 - 36.0 g/dL    RDW 13.4 11.5 - 14.5 %    Platelets 278 150 - 350 K/uL    MPV 9.4 9.2 - 12.9 fL    Immature Granulocytes 0.8 (H) 0.0 - 0.5 %    Gran # (ANC) 12.2 (H) 1.8 - 7.7 K/uL    Immature Grans (Abs) 0.11 (H) 0.00 - 0.04 K/uL    Lymph # 0.8 (L) 1.0 - 4.8 K/uL    Mono # 0.5 0.3 - 1.0 K/uL    Eos # 0.0 0.0 - 0.5 K/uL    Baso # 0.03 0.00 - 0.20 K/uL    nRBC 0 0 /100 WBC    Gran% 89.0 (H) 38.0 - 73.0 %    Lymph% 6.1 (L) 18.0 - 48.0 %    Mono% 3.9 (L) 4.0 - 15.0 %    Eosinophil% 0.0 0.0 - 8.0 %    Basophil% 0.2 0.0 - 1.9 %    Differential Method  Automated    Comprehensive metabolic panel    Collection Time: 03/25/18  1:58 PM   Result Value Ref Range    Sodium 131 (L) 136 - 145 mmol/L    Potassium 4.3 3.5 - 5.1 mmol/L    Chloride 97 95 - 110 mmol/L    CO2 23 23 - 29 mmol/L    Glucose 116 (H) 70 - 110 mg/dL    BUN, Bld 7 6 - 20 mg/dL    Creatinine 0.7 0.5 - 1.4 mg/dL    Calcium 9.2 8.7 - 10.5 mg/dL    Total Protein 8.0 6.0 - 8.4 g/dL    Albumin 3.5 3.5 - 5.2 g/dL    Total Bilirubin 0.9 0.1 - 1.0 mg/dL    Alkaline Phosphatase 66 55 - 135 U/L    AST 23 10 - 40 U/L    ALT 21 10 - 44 U/L    Anion Gap 11 8 - 16 mmol/L    eGFR if African American >60.0 >60 mL/min/1.73 m^2    eGFR if non African American >60.0 >60 mL/min/1.73 m^2   Lactic acid, plasma #1    Collection Time: 03/25/18  1:58 PM   Result Value Ref Range    Lactate (Lactic Acid) 0.9 0.5 - 2.2 mmol/L   Protime-INR    Collection Time: 03/25/18  1:58 PM   Result Value Ref Range    Prothrombin Time 11.8 9.0 - 12.5 sec    INR 1.1 0.8 - 1.2   Procalcitonin    Collection Time: 03/25/18  1:58 PM   Result Value Ref Range    Procalcitonin 17.87 (H) <0.25 ng/mL   Blood culture x two cultures. Draw prior to antibiotics.    Collection Time: 03/25/18  1:59 PM   Result Value Ref Range    Blood Culture, Routine       Gram stain vivian bottle: Gram positive cocci in chains resembling Strep     Blood Culture, Routine       Results called to and read back by: Usha Mckeon RN  03/26/2018      Blood Culture, Routine 02:12     Blood Culture, Routine       Gram stain aer bottle: Gram positive cocci in chains resembling Strep     Blood Culture, Routine 03/26/2018  03:29    Influenza antigen Nasopharyngeal Swab    Collection Time: 03/25/18  1:59 PM   Result Value Ref Range    Influenza A Ag, EIA Negative Negative    Influenza B Ag, EIA Negative Negative    Flu A & B Source Nasopharyngeal Swab    ISTAT PROCEDURE    Collection Time: 03/25/18  2:01 PM   Result Value Ref Range    POC PH 7.481 (H) 7.35 - 7.45    POC PCO2 39.5 35 -  45 mmHg    POC PO2 46 40 - 60 mmHg    POC HCO3 29.5 (H) 24 - 28 mmol/L    POC BE 6 -2 to 2 mmol/L    POC SATURATED O2 85 (L) 95 - 100 %    POC Lactate 0.71 0.5 - 2.2 mmol/L    POC TCO2 31 (H) 24 - 29 mmol/L    Sample VENOUS     Site Other     Allens Test N/A    POCT urine pregnancy    Collection Time: 03/25/18  2:20 PM   Result Value Ref Range    POC Preg Test, Ur Negative Negative     Acceptable Yes    Lactic acid, plasma #2    Collection Time: 03/25/18  6:29 PM   Result Value Ref Range    Lactate (Lactic Acid) 0.9 0.5 - 2.2 mmol/L   2D echo with color flow doppler    Collection Time: 03/25/18  7:51 PM   Result Value Ref Range    EF 65 55 - 65    Diastolic Dysfunction No     Est. PA Systolic Pressure 24.16     Tricuspid Valve Regurgitation TRIVIAL    Urinalysis, Reflex to Urine Culture Urine, Clean Catch    Collection Time: 03/25/18  8:01 PM   Result Value Ref Range    Specimen UA Urine, Clean Catch     Color, UA Yellow Yellow, Straw, Xiomy    Appearance, UA Cloudy (A) Clear    pH, UA 5.0 5.0 - 8.0    Specific Gravity, UA 1.015 1.005 - 1.030    Protein, UA Negative Negative    Glucose, UA Negative Negative    Ketones, UA Negative Negative    Bilirubin (UA) Negative Negative    Occult Blood UA Negative Negative    Nitrite, UA Negative Negative    Urobilinogen, UA Negative <2.0 EU/dL    Leukocytes, UA Negative Negative   Drug screen panel, emergency    Collection Time: 03/25/18  8:01 PM   Result Value Ref Range    Benzodiazepines Negative     Methadone metabolites Negative     Cocaine (Metab.) Presumptive Positive     Opiate Scrn, Ur Negative     Barbiturate Screen, Ur Negative     Amphetamine Screen, Ur Negative     THC Negative     Phencyclidine Negative     Creatinine, Random Ur 96.0 15.0 - 325.0 mg/dL    Toxicology Information SEE COMMENT    POCT urine pregnancy    Collection Time: 03/25/18  8:01 PM   Result Value Ref Range    POC Preg Test, Ur Negative Negative     Acceptable Yes     Urinalysis Microscopic    Collection Time: 03/25/18  8:01 PM   Result Value Ref Range    RBC, UA 3 0 - 4 /hpf    WBC, UA 2 0 - 5 /hpf    Bacteria, UA Few (A) None-Occ /hpf    Squam Epithel, UA 27 /hpf    Microscopic Comment SEE COMMENT    CBC auto differential    Collection Time: 03/26/18  4:04 AM   Result Value Ref Range    WBC 8.09 3.90 - 12.70 K/uL    RBC 3.88 (L) 4.00 - 5.40 M/uL    Hemoglobin 10.6 (L) 12.0 - 16.0 g/dL    Hematocrit 33.3 (L) 37.0 - 48.5 %    MCV 86 82 - 98 fL    MCH 27.3 27.0 - 31.0 pg    MCHC 31.8 (L) 32.0 - 36.0 g/dL    RDW 13.8 11.5 - 14.5 %    Platelets 209 150 - 350 K/uL    MPV 9.5 9.2 - 12.9 fL    Immature Granulocytes 0.5 0.0 - 0.5 %    Gran # (ANC) 6.7 1.8 - 7.7 K/uL    Immature Grans (Abs) 0.04 0.00 - 0.04 K/uL    Lymph # 0.8 (L) 1.0 - 4.8 K/uL    Mono # 0.6 0.3 - 1.0 K/uL    Eos # 0.0 0.0 - 0.5 K/uL    Baso # 0.02 0.00 - 0.20 K/uL    nRBC 0 0 /100 WBC    Gran% 82.5 (H) 38.0 - 73.0 %    Lymph% 10.0 (L) 18.0 - 48.0 %    Mono% 6.8 4.0 - 15.0 %    Eosinophil% 0.0 0.0 - 8.0 %    Basophil% 0.2 0.0 - 1.9 %    Platelet Estimate Appears normal     Differential Method Automated    Comprehensive metabolic panel    Collection Time: 03/26/18  4:04 AM   Result Value Ref Range    Sodium 137 136 - 145 mmol/L    Potassium 4.5 3.5 - 5.1 mmol/L    Chloride 104 95 - 110 mmol/L    CO2 26 23 - 29 mmol/L    Glucose 116 (H) 70 - 110 mg/dL    BUN, Bld 11 6 - 20 mg/dL    Creatinine 1.0 0.5 - 1.4 mg/dL    Calcium 8.7 8.7 - 10.5 mg/dL    Total Protein 6.4 6.0 - 8.4 g/dL    Albumin 2.7 (L) 3.5 - 5.2 g/dL    Total Bilirubin 0.4 0.1 - 1.0 mg/dL    Alkaline Phosphatase 60 55 - 135 U/L    AST 19 10 - 40 U/L    ALT 17 10 - 44 U/L    Anion Gap 7 (L) 8 - 16 mmol/L    eGFR if African American >60.0 >60 mL/min/1.73 m^2    eGFR if non African American >60.0 >60 mL/min/1.73 m^2   Magnesium    Collection Time: 03/26/18  4:04 AM   Result Value Ref Range    Magnesium 2.0 1.6 - 2.6 mg/dL   Phosphorus    Collection Time:  03/26/18  4:04 AM   Result Value Ref Range    Phosphorus 1.9 (L) 2.7 - 4.5 mg/dL      Imaging:  Imaging Results          X-Ray Chest AP Portable (Final result)  Result time 03/25/18 14:44:59    Final result by Topher Dean MD (03/25/18 14:44:59)                 Impression:      No acute cardiopulmonary process.      Electronically signed by: Topher Dean MD  Date:    03/25/2018  Time:    14:44             Narrative:    EXAMINATION:  XR CHEST AP PORTABLE    CLINICAL HISTORY:  Sepsis;    TECHNIQUE:  PA and lateral views of the chest were performed.    COMPARISON:  None    FINDINGS:  Cardiac silhouette and mediastinal contours are normal.  Lungs are clear.  Osseous structures are intact.                              @SIG@

## 2018-03-26 NOTE — HOSPITAL COURSE
Admitted to hospital medicine started on vanc and rocephin. 3/26 2/2 bottles growing strep. Obtained LIZET, no vegetation found 3/27. Patient transitioned to IV PCN for strep pyogenes 3/28; however grew MRSA from blood cultures from 3/25, repeated cultures 3/29 and started patient on vancomycin. Patient placed on methadone taper per addiction psych completed 3/29. Patient continues to have sharp chest and back pain on palpation and deep breathing. MRI spine ordered to investigate for osteomyelitis, unrevealing but for sugsegmental consolidation in lungs, ordered CT chest, found scattered nodules, with one 2.3 x 1.9 with concern for possible abscess. Will repeat CT near patient's discharge after abx completion. Patient difficulty with maintaining PIV, PICC consult for midline insertion 4/2.

## 2018-03-26 NOTE — PLAN OF CARE
"CM to bedside - pt provided assessment info. Pt is independent w/ no DME in place, lives w/ S/O in a homeless setting. Pt is an IVDU - last use prior to admit. Pt is Medicaid pending and will need 2-6 wks of IV abx, therefore pt will likely stay in house for treatment - pt is aware. Pt states she has no address/po box, no family contacts to provide, no phone number and does not have an MD or f/u at any local clinics.    CM provided patient anticipated LISA which will be update by nursing staff. Patient provided a Blue "My Health Packet" for d/c planning and health tool. Patient verbalized understanding.     03/26/18 4431   Discharge Assessment   Assessment Type Discharge Planning Assessment   Confirmed/corrected address and phone number on facesheet? Yes  (pt is homeless - states has no address or po box)   Assessment information obtained from? Patient   Expected Length of Stay (days) 7   Communicated expected length of stay with patient/caregiver yes   Prior to hospitilization cognitive status: Alert/Oriented   Prior to hospitalization functional status: Independent   Current cognitive status: Alert/Oriented   Current Functional Status: Independent   Facility Arrived From: N/A   Lives With significant other  (homeless)   Able to Return to Prior Arrangements unable to determine at this time (comments)   Is patient able to care for self after discharge? Unable to determine at this time (comments)   Who are your caregiver(s) and their phone number(s)? none given   Patient's perception of discharge disposition homeless;acute care hospital  (pt will likely remain in house for any IV abx )   Readmission Within The Last 30 Days no previous admission in last 30 days   Patient currently being followed by outpatient case management? No   Patient currently receives any other outside agency services? No   Equipment Currently Used at Home none   Do you have any problems affording any of your prescribed medications? TBD   Is the " patient taking medications as prescribed? yes   Does the patient have transportation home? Yes   Transportation Available other (see comments);public transportation  (pt walks)   Dialysis Name and Scheduled days N/A   Does the patient receive services at the [facility]? No   Discharge Plan A Other  (homeless - return to prior setting)   Discharge Plan B Rehab  (if therapy is needed as pt is medicaid pending)   Patient/Family In Agreement With Plan yes

## 2018-03-26 NOTE — SUBJECTIVE & OBJECTIVE
Interval Hx: Patient with subjective fevers last fever 3/25 at 2:13pm 102.9. No episodes of diarrhea overnight. 2/2 bottles growing strep. Obtaining LIZET.    Review of Systems   Constitutional: Positive for diaphoresis and fever.   Eyes: Negative for visual disturbance.   Respiratory: Negative for shortness of breath.    Cardiovascular: Positive for chest pain. Negative for palpitations and leg swelling.   Gastrointestinal: Negative for abdominal pain, diarrhea, nausea and vomiting.   Endocrine: Negative for polyuria.   Genitourinary: Negative for dysuria and hematuria.   Musculoskeletal: Positive for myalgias. Negative for arthralgias.   Skin: Positive for rash and wound.   Neurological: Negative for weakness, numbness and headaches.   Psychiatric/Behavioral: Negative for confusion.     Objective:     Vital Signs (Most Recent):  Temp: 99.4 °F (37.4 °C) (03/26/18 0747)  Pulse: 75 (03/26/18 0841)  Resp: 18 (03/26/18 0747)  BP: 114/61 (03/26/18 0747)  SpO2: 97 % (03/26/18 0747) Vital Signs (24h Range):  Temp:  [98.2 °F (36.8 °C)-103.5 °F (39.7 °C)] 99.4 °F (37.4 °C)  Pulse:  [] 75  Resp:  [18-21] 18  SpO2:  [93 %-99 %] 97 %  BP: (114-141)/(60-86) 114/61     Weight: 68 kg (150 lb)  Body mass index is 27.44 kg/m².    Physical Exam   Constitutional: She is oriented to person, place, and time. No distress.   HENT:   Mouth/Throat: Oropharynx is clear and moist.   Eyes: Conjunctivae and EOM are normal. No scleral icterus.   Neck: Normal range of motion. No JVD present.   Cardiovascular: Normal rate, regular rhythm and normal heart sounds.    1/6 murmur LUSB   Pulmonary/Chest: Effort normal and breath sounds normal. She has no wheezes. She has no rales. She exhibits tenderness.   Abdominal: Soft. Bowel sounds are normal. She exhibits no distension. There is no tenderness.   Musculoskeletal: She exhibits no edema.   Neurological: She is alert and oriented to person, place, and time. No sensory deficit.   Skin: Skin is  warm. She is diaphoretic.   Multiple pustule lesions and small areas of scabbing on face, extremities and back. Face with scabbing effacing chin.   Psychiatric: She has a normal mood and affect.         CRANIAL NERVES     CN III, IV, VI   Extraocular motions are normal.        Significant Labs:   ABGs:     Recent Labs  Lab 03/25/18  1401   PH 7.481*   PCO2 39.5   HCO3 29.5*   POCSATURATED 85*   BE 6     BMP:     Recent Labs  Lab 03/26/18  0404   *      K 4.5      CO2 26   BUN 11   CREATININE 1.0   CALCIUM 8.7   MG 2.0     CBC:     Recent Labs  Lab 03/25/18  1358 03/26/18  0404   WBC 13.71* 8.09   HGB 12.1 10.6*   HCT 36.8* 33.3*    209     Lactic Acid:     Recent Labs  Lab 03/25/18  1358 03/25/18  1829   LACTATE 0.9 0.9     Procalcitonin 3/25   17.87    Significant Imaging: I have reviewed all pertinent imaging results/findings within the past 24 hours.

## 2018-03-26 NOTE — PHARMACY MED REC
"Admission Medication Reconciliation - Pharmacy Consult Note    The home medication history was taken by Lisy Ceballos Pharmacy Tech.  Based on information gathered and subsequent review by the clinical pharmacist, the items below may need attention.    You may go to "Admission" then "Reconcile Home Medications" tabs to review and/or act upon these items.        No issues noted with the medication reconciliation.          Please address this information as you see fit.  Feel free to contact us if you have any questions or require assistance.    Valeria Barton PharmD, [facility]  Internal Medicine Clinical Pharmacy Specialist  Spectra link: [phone]              .    .        "

## 2018-03-27 ENCOUNTER — ANESTHESIA EVENT (OUTPATIENT)
Dept: MEDSURG UNIT | Facility: HOSPITAL | Age: 33
DRG: 871 | End: 2018-03-27
Payer: MEDICAID

## 2018-03-27 ENCOUNTER — SURGERY (OUTPATIENT)
Age: 33
End: 2018-03-27

## 2018-03-27 ENCOUNTER — ANESTHESIA (OUTPATIENT)
Dept: MEDSURG UNIT | Facility: HOSPITAL | Age: 33
DRG: 871 | End: 2018-03-27
Payer: MEDICAID

## 2018-03-27 LAB
ALBUMIN SERPL BCP-MCNC: 2.4 G/DL
ALP SERPL-CCNC: 55 U/L
ALT SERPL W/O P-5'-P-CCNC: 14 U/L
ANION GAP SERPL CALC-SCNC: 6 MMOL/L
AORTIC ATHEROMA: YES
AST SERPL-CCNC: 11 U/L
BASOPHILS # BLD AUTO: 0.02 K/UL
BASOPHILS NFR BLD: 0.3 %
BILIRUB SERPL-MCNC: 0.2 MG/DL
BUN SERPL-MCNC: 10 MG/DL
CALCIUM SERPL-MCNC: 8.5 MG/DL
CHLORIDE SERPL-SCNC: 108 MMOL/L
CO2 SERPL-SCNC: 24 MMOL/L
CREAT SERPL-MCNC: 0.7 MG/DL
DIASTOLIC DYSFUNCTION: NO
DIFFERENTIAL METHOD: ABNORMAL
EOSINOPHIL # BLD AUTO: 0.1 K/UL
EOSINOPHIL NFR BLD: 1.1 %
ERYTHROCYTE [DISTWIDTH] IN BLOOD BY AUTOMATED COUNT: 14.1 %
EST. GFR  (AFRICAN AMERICAN): >60 ML/MIN/1.73 M^2
EST. GFR  (NON AFRICAN AMERICAN): >60 ML/MIN/1.73 M^2
GLUCOSE SERPL-MCNC: 144 MG/DL
HBV CORE IGM SERPL QL IA: NEGATIVE
HBV SURFACE AB SER-ACNC: POSITIVE M[IU]/ML
HCT VFR BLD AUTO: 29.8 %
HCV AB SERPL QL IA: POSITIVE
HCV LOG: 1.11 LOG (10) IU/ML
HCV RNA QUANT PCR: 13 IU/ML
HCV, QUALITATIVE: DETECTED IU/ML
HGB BLD-MCNC: 9.8 G/DL
IMM GRANULOCYTES # BLD AUTO: 0.06 K/UL
IMM GRANULOCYTES NFR BLD AUTO: 0.8 %
LYMPHOCYTES # BLD AUTO: 1.8 K/UL
LYMPHOCYTES NFR BLD: 24.9 %
MAGNESIUM SERPL-MCNC: 1.8 MG/DL
MCH RBC QN AUTO: 28.3 PG
MCHC RBC AUTO-ENTMCNC: 32.9 G/DL
MCV RBC AUTO: 86 FL
MITRAL VALVE MOBILITY: NORMAL
MONOCYTES # BLD AUTO: 0.8 K/UL
MONOCYTES NFR BLD: 11 %
NEUTROPHILS # BLD AUTO: 4.5 K/UL
NEUTROPHILS NFR BLD: 61.9 %
NRBC BLD-RTO: 0 /100 WBC
PHOSPHATE SERPL-MCNC: 2.8 MG/DL
PLATELET # BLD AUTO: 211 K/UL
PMV BLD AUTO: 10 FL
POTASSIUM SERPL-SCNC: 4 MMOL/L
PROT SERPL-MCNC: 6 G/DL
RBC # BLD AUTO: 3.46 M/UL
RETIRED EF AND QEF - SEE NOTES: 55 (ref 55–65)
SODIUM SERPL-SCNC: 138 MMOL/L
TRICUSPID VALVE REGURGITATION: ABNORMAL
WBC # BLD AUTO: 7.2 K/UL

## 2018-03-27 PROCEDURE — D9220A PRA ANESTHESIA: Mod: CRNA,,, | Performed by: NURSE ANESTHETIST, CERTIFIED REGISTERED

## 2018-03-27 PROCEDURE — 25000003 PHARM REV CODE 250: Performed by: HOSPITALIST

## 2018-03-27 PROCEDURE — 93325 DOPPLER ECHO COLOR FLOW MAPG: CPT

## 2018-03-27 PROCEDURE — 63600175 PHARM REV CODE 636 W HCPCS: Performed by: STUDENT IN AN ORGANIZED HEALTH CARE EDUCATION/TRAINING PROGRAM

## 2018-03-27 PROCEDURE — B246ZZ4 ULTRASONOGRAPHY OF RIGHT AND LEFT HEART, TRANSESOPHAGEAL: ICD-10-PCS | Performed by: ANESTHESIOLOGY

## 2018-03-27 PROCEDURE — 94761 N-INVAS EAR/PLS OXIMETRY MLT: CPT

## 2018-03-27 PROCEDURE — 93320 DOPPLER ECHO COMPLETE: CPT | Mod: 26,,, | Performed by: INTERNAL MEDICINE

## 2018-03-27 PROCEDURE — 37000009 HC ANESTHESIA EA ADD 15 MINS

## 2018-03-27 PROCEDURE — 84100 ASSAY OF PHOSPHORUS: CPT

## 2018-03-27 PROCEDURE — 25000003 PHARM REV CODE 250: Performed by: NURSE ANESTHETIST, CERTIFIED REGISTERED

## 2018-03-27 PROCEDURE — 36415 COLL VENOUS BLD VENIPUNCTURE: CPT

## 2018-03-27 PROCEDURE — 63600175 PHARM REV CODE 636 W HCPCS: Performed by: NURSE ANESTHETIST, CERTIFIED REGISTERED

## 2018-03-27 PROCEDURE — 25000003 PHARM REV CODE 250: Performed by: STUDENT IN AN ORGANIZED HEALTH CARE EDUCATION/TRAINING PROGRAM

## 2018-03-27 PROCEDURE — 83735 ASSAY OF MAGNESIUM: CPT

## 2018-03-27 PROCEDURE — 11000001 HC ACUTE MED/SURG PRIVATE ROOM

## 2018-03-27 PROCEDURE — 93325 DOPPLER ECHO COLOR FLOW MAPG: CPT | Mod: 26,,, | Performed by: INTERNAL MEDICINE

## 2018-03-27 PROCEDURE — 99232 SBSQ HOSP IP/OBS MODERATE 35: CPT | Mod: ,,, | Performed by: HOSPITALIST

## 2018-03-27 PROCEDURE — 85025 COMPLETE CBC W/AUTO DIFF WBC: CPT

## 2018-03-27 PROCEDURE — 37000008 HC ANESTHESIA 1ST 15 MINUTES

## 2018-03-27 PROCEDURE — 25000003 PHARM REV CODE 250: Performed by: PSYCHIATRY & NEUROLOGY

## 2018-03-27 PROCEDURE — 93312 ECHO TRANSESOPHAGEAL: CPT | Mod: 26,,, | Performed by: INTERNAL MEDICINE

## 2018-03-27 PROCEDURE — 80053 COMPREHEN METABOLIC PANEL: CPT

## 2018-03-27 PROCEDURE — D9220A PRA ANESTHESIA: Mod: ANES,,, | Performed by: ANESTHESIOLOGY

## 2018-03-27 RX ORDER — METHADONE HYDROCHLORIDE 10 MG/1
10 TABLET ORAL NIGHTLY PRN
Status: COMPLETED | OUTPATIENT
Start: 2018-03-27 | End: 2018-03-27

## 2018-03-27 RX ORDER — ONDANSETRON 8 MG/1
8 TABLET, ORALLY DISINTEGRATING ORAL EVERY 8 HOURS PRN
Status: DISCONTINUED | OUTPATIENT
Start: 2018-03-27 | End: 2018-04-07 | Stop reason: HOSPADM

## 2018-03-27 RX ORDER — PROPOFOL 10 MG/ML
VIAL (ML) INTRAVENOUS
Status: DISCONTINUED | OUTPATIENT
Start: 2018-03-27 | End: 2018-03-27

## 2018-03-27 RX ORDER — LIDOCAINE HCL/PF 100 MG/5ML
SYRINGE (ML) INTRAVENOUS
Status: DISCONTINUED | OUTPATIENT
Start: 2018-03-27 | End: 2018-03-27

## 2018-03-27 RX ORDER — MIDAZOLAM HYDROCHLORIDE 1 MG/ML
INJECTION, SOLUTION INTRAMUSCULAR; INTRAVENOUS
Status: DISCONTINUED | OUTPATIENT
Start: 2018-03-27 | End: 2018-03-27

## 2018-03-27 RX ORDER — SODIUM CHLORIDE 9 MG/ML
INJECTION, SOLUTION INTRAVENOUS CONTINUOUS PRN
Status: DISCONTINUED | OUTPATIENT
Start: 2018-03-27 | End: 2018-03-27

## 2018-03-27 RX ADMIN — LOPERAMIDE HYDROCHLORIDE 2 MG: 2 CAPSULE ORAL at 08:03

## 2018-03-27 RX ADMIN — Medication 1 G: at 02:03

## 2018-03-27 RX ADMIN — PROPOFOL 40 MG: 10 INJECTION, EMULSION INTRAVENOUS at 10:03

## 2018-03-27 RX ADMIN — SODIUM CHLORIDE: 0.9 INJECTION, SOLUTION INTRAVENOUS at 10:03

## 2018-03-27 RX ADMIN — METHADONE HYDROCHLORIDE 10 MG: 10 TABLET ORAL at 09:03

## 2018-03-27 RX ADMIN — MIDAZOLAM 2 MG: 1 INJECTION INTRAMUSCULAR; INTRAVENOUS at 10:03

## 2018-03-27 RX ADMIN — PROPOFOL 50 MG: 10 INJECTION, EMULSION INTRAVENOUS at 10:03

## 2018-03-27 RX ADMIN — TIZANIDINE 4 MG: 2 TABLET ORAL at 08:03

## 2018-03-27 RX ADMIN — CEFTRIAXONE SODIUM 2 G: 2 INJECTION, POWDER, FOR SOLUTION INTRAMUSCULAR; INTRAVENOUS at 08:03

## 2018-03-27 RX ADMIN — PROPOFOL 30 MG: 10 INJECTION, EMULSION INTRAVENOUS at 10:03

## 2018-03-27 RX ADMIN — TIZANIDINE 4 MG: 2 TABLET ORAL at 02:03

## 2018-03-27 RX ADMIN — ACETAMINOPHEN 650 MG: 325 TABLET ORAL at 09:03

## 2018-03-27 RX ADMIN — LIDOCAINE HYDROCHLORIDE 60 MG: 20 INJECTION, SOLUTION INTRAVENOUS at 10:03

## 2018-03-27 RX ADMIN — IBUPROFEN 400 MG: 400 TABLET, FILM COATED ORAL at 08:03

## 2018-03-27 RX ADMIN — PROPOFOL 100 MG: 10 INJECTION, EMULSION INTRAVENOUS at 10:03

## 2018-03-27 RX ADMIN — METHADONE HYDROCHLORIDE 10 MG: 10 TABLET ORAL at 08:03

## 2018-03-27 RX ADMIN — MUPIROCIN: 20 OINTMENT TOPICAL at 01:03

## 2018-03-27 NOTE — PLAN OF CARE
Problem: Patient Care Overview  Goal: Plan of Care Review  Outcome: Ongoing (interventions implemented as appropriate)  Discussed plan of care with the patient including medications given.  Patient had scheduled LIZET today.  Pain assessed offered ordered medication.

## 2018-03-27 NOTE — ANESTHESIA POSTPROCEDURE EVALUATION
"Anesthesia Post Evaluation    Patient: Kimberlee Ling    Procedure(s) Performed: Procedure(s) (LRB):  TRANSESOPHAGEAL ECHOCARDIOGRAM (LIZET) (N/A)    Final Anesthesia Type: general  Patient location during evaluation: PACU  Patient participation: Yes- Able to Participate  Level of consciousness: awake and alert  Post-procedure vital signs: reviewed and stable  Pain management: adequate  Airway patency: patent  PONV status at discharge: No PONV  Anesthetic complications: no      Cardiovascular status: hemodynamically stable  Respiratory status: unassisted  Hydration status: euvolemic  Follow-up not needed.        Visit Vitals  /70 (BP Location: Left arm, Patient Position: Lying)   Pulse 73   Temp 37.2 °C (99 °F) (Temporal)   Resp 20   Ht 5' 2" (1.575 m)   Wt 68 kg (150 lb)   LMP  (LMP Unknown)   SpO2 98%   Breastfeeding? No   BMI 27.44 kg/m²       Pain/Kaushal Score: Pain Assessment Performed: Yes (3/27/2018 11:00 AM)  Presence of Pain: complains of pain/discomfort (3/27/2018 11:00 AM)  Pain Rating Prior to Med Admin: 10 (3/27/2018  9:00 AM)  Pain Rating Post Med Admin: 2 (3/26/2018 10:02 PM)  Kaushal Score: 10 (3/27/2018 11:00 AM)      "

## 2018-03-27 NOTE — TRANSFER OF CARE
"Anesthesia Transfer of Care Note    Patient: Kimberlee Hawkinsridmarisa    Procedure(s) Performed: Procedure(s) (LRB):  TRANSESOPHAGEAL ECHOCARDIOGRAM (LIZET) (N/A)    Patient location: PACU    Anesthesia Type: general    Transport from OR: Transported from OR on 2-3 L/min O2 by NC with adequate spontaneous ventilation    Post pain: adequate analgesia    Post assessment: no apparent anesthetic complications and tolerated procedure well    Post vital signs: stable    Level of consciousness: awake, alert and oriented    Nausea/Vomiting: no nausea/vomiting    Complications: none    Transfer of care protocol was followed      Last vitals:   Visit Vitals  BP (!) 107/59 (BP Location: Left arm, Patient Position: Lying)   Pulse 77   Temp 37.2 °C (99 °F) (Temporal)   Resp 20   Ht 5' 2" (1.575 m)   Wt 68 kg (150 lb)   LMP  (LMP Unknown)   SpO2 98%   Breastfeeding? No   BMI 27.44 kg/m²     "

## 2018-03-27 NOTE — ASSESSMENT & PLAN NOTE
LIZET for evaluation of endocarditis    -The risks, benefits & alternatives of the procedure were explained to the patient.    -The risks of transesophageal echo include but are not limited to:  Dental trauma, esophageal trauma/perforation, bleeding, laryngospasm/brochospasm, aspiration, sore throat/hoarseness, & dislodgement of the endotracheal tube/nasogastric tube (where applicable).    -The risks of moderate sedation include hypotension, respiratory depression, arrhythmias, bronchospasm, & death.    -Informed consent was obtained & the patient is agreeable to proceed with the procedure.

## 2018-03-27 NOTE — ASSESSMENT & PLAN NOTE
Patient everyday heroin/fentanyl  Prn clonidine, loperamide, ibuprofen, and tizanidine for withdrawal symptoms  Consult to addiction psych who has started methadone

## 2018-03-27 NOTE — PROGRESS NOTES
[facility] Hospital Medicine  Progress Note    Patient Name: Kimberlee Ling  MRN: [id number]  Patient Class: IP- Inpatient   Admission Date: 3/25/2018  Length of Stay: 2 days  Attending Physician: Corinne Perry MD  Primary Care Provider: Primary Doctor [facility] Medicine Team: [facility]OSP MED 4 Erin Bosch MD    Subjective:     Principal Problem:Sepsis    HPI:  33yoF with PMHx of reported Hep C and IVDU presents with 2 day hx of fevers with associated SOB, sharp reproducible chest pain radiating to back, myalgias, and one loose bowel movement. Patient claims she has had flu like symptoms before but this different. Patient claims her and her boyfriend live in a tent and he has similar symptoms. Patient says she has noticed areas of pus throughout her arms and has been picking at her face and says that seems infected. Patient denies nausea, vomiting, cough, or dysuria. Patient uses heroin daily and injects cocaine once weekly, last used heroin yesterday. Patient claims she learned she had Hep C from needle exchange clinic 6 mos ago and has never received treatment.    Hospital Course:  Admitted to hospital medicine started on vanc and rocephin. 3/26 2/2 bottles growing strep. Obtaining LIZET.      Interval History:   NAEON. Started on methadone but still having diffuse pain and myalgias. Going for LIZET.     Review of Systems   Constitutional: Negative for appetite change.   Respiratory: Negative for shortness of breath.    Cardiovascular: Positive for chest pain.   Gastrointestinal: Negative for constipation.   Musculoskeletal: Positive for arthralgias.     Objective:     Vital Signs (Most Recent):  Temp: 99 °F (37.2 °C) (03/27/18 1054)  Pulse: 73 (03/27/18 1130)  Resp: 20 (03/27/18 1130)  BP: 116/70 (03/27/18 1130)  SpO2: 98 % (03/27/18 1130) Vital Signs (24h Range):  Temp:  [98.3 °F (36.8 °C)-100 °F (37.8 °C)] 99 °F (37.2 °C)  Pulse:  [70-88] 73  Resp:  [14-80] 20  SpO2:  [96 %-100 %] 98  %  BP: (107-138)/(59-92) 116/70     Weight: 68 kg (150 lb)  Body mass index is 27.44 kg/m².    Intake/Output Summary (Last 24 hours) at 03/27/18 1139  Last data filed at 03/27/18 1034   Gross per 24 hour   Intake              500 ml   Output                0 ml   Net              500 ml      Physical Exam   Constitutional: She is oriented to person, place, and time. No distress.   HENT:   Mouth/Throat: Oropharynx is clear and moist.   Eyes: Conjunctivae and EOM are normal. No scleral icterus.   Neck: Normal range of motion. No JVD present.   Cardiovascular: Normal rate, regular rhythm and normal heart sounds.    1/6 murmur LUSB   Pulmonary/Chest: Effort normal and breath sounds normal. She has no wheezes. She has no rales. She exhibits tenderness.   Abdominal: Soft. Bowel sounds are normal. She exhibits no distension. There is no tenderness.   Musculoskeletal: She exhibits no edema.   Neurological: She is alert and oriented to person, place, and time. No sensory deficit.   Skin: Skin is warm. She is diaphoretic.   Multiple pustule lesions and small areas of scabbing on face, extremities and back. Face with scabbing effacing chin.   Psychiatric: She has a normal mood and affect.       Significant Labs: All pertinent labs within the past 24 hours have been reviewed.    Significant Imaging: I have reviewed all pertinent imaging results/findings within the past 24 hours.    Assessment/Plan:      * Sepsis    Patient with tachycardia and fever up to 103.5 in setting of IVDU also has multiple pustules on exam and facial lesion consistent with impetigo  Procal 17.89, leukocytosis 13.71  Ddx includes IE vs GPC bacteremia  Currently CXR not consistent with PNA, influenza negative  Blood Cx x 2 drawn in the Ed, 2/2 bottles growing strep pyogenes, sens pending, repeated cultures 3/26  Given 30cc/kg zosyn and vanc in the ED  Bactroban for impetigo  Will DC vanc 3/27 and continue rocephin  TTE ordered, no vegetations, ordered  LIZET  HIV testing ordered         Active intravenous drug use    Patient everyday heroin/fentanyl  Prn clonidine, loperamide, ibuprofen, and tizanidine for withdrawal symptoms  Consult to addiction psych who has started methadone           Chest pain    Reproducible on exam  EKG without STEMI or depressions  No other cardiac risk factors other than IV cocaine use intermittently; however denies use this week   Likely MSK, prn ibuprofen ordered          Hepatitis C    Reported per patient who does not have medical care, patient discovered 6 mos ago from needle exchange service; not currently being treated  Will order chronic hepatitis labs including Hep C RNA            VTE Risk Mitigation         Ordered     IP VTE LOW RISK PATIENT  Once      03/25/18 1900              Erin Bosch MD  Department of Hospital Medicine   [facility]

## 2018-03-27 NOTE — SUBJECTIVE & OBJECTIVE
History reviewed. No pertinent past medical history.    History reviewed. No pertinent surgical history.    Review of patient's allergies indicates:  No Known Allergies    No current facility-administered medications on file prior to encounter.      No current outpatient prescriptions on file prior to encounter.     Family History     None        Social History Main Topics    Smoking status: Current Some Day Smoker     Packs/day: 0.50     Types: Cigarettes    Smokeless tobacco: Never Used    Alcohol use Not on file    Drug use: Yes     Frequency: 3.0 times per week     Types: IV      Comment: Heroin    Sexual activity: Not on file     Review of Systems   Constitution: Positive for fever and malaise/fatigue. Negative for chills.   HENT: Negative.    Cardiovascular: Positive for chest pain. Negative for dyspnea on exertion, irregular heartbeat, leg swelling, orthopnea, palpitations and paroxysmal nocturnal dyspnea.   Respiratory: Positive for shortness of breath. Negative for wheezing.    Skin: Negative for color change and rash.   Musculoskeletal: Positive for myalgias. Negative for arthritis and back pain.   Gastrointestinal: Negative for abdominal pain, constipation, diarrhea and nausea.   Neurological: Negative for dizziness, numbness and paresthesias.   Psychiatric/Behavioral: Negative.      Objective:     Vital Signs (Most Recent):  Temp: 98.3 °F (36.8 °C) (03/27/18 0711)  Pulse: 88 (03/27/18 0711)  Resp: 16 (03/27/18 0711)  BP: (!) 126/92 (03/27/18 0711)  SpO2: 97 % (03/27/18 0711) Vital Signs (24h Range):  Temp:  [98.3 °F (36.8 °C)-100 °F (37.8 °C)] 98.3 °F (36.8 °C)  Pulse:  [70-88] 88  Resp:  [16-80] 16  SpO2:  [96 %-98 %] 97 %  BP: (110-138)/(67-92) 126/92     Weight: 68 kg (150 lb)  Body mass index is 27.44 kg/m².    SpO2: 97 %  O2 Device (Oxygen Therapy): room air    No intake or output data in the 24 hours ending 03/27/18 0945    Lines/Drains/Airways     Peripheral Intravenous Line                  Peripheral IV - Single Lumen 03/26/18 0623 1 day                Physical Exam    Significant Labs:   CMP   Recent Labs  Lab 03/25/18  1358 03/26/18  0404 03/27/18  0424   * 137 138   K 4.3 4.5 4.0   CL 97 104 108   CO2 23 26 24   * 116* 144*   BUN 7 11 10   CREATININE 0.7 1.0 0.7   CALCIUM 9.2 8.7 8.5*   PROT 8.0 6.4 6.0   ALBUMIN 3.5 2.7* 2.4*   BILITOT 0.9 0.4 0.2   ALKPHOS 66 60 55   AST 23 19 11   ALT 21 17 14   ANIONGAP 11 7* 6*   ESTGFRAFRICA >60.0 >60.0 >60.0   EGFRNONAA >60.0 >60.0 >60.0   , CBC   Recent Labs  Lab 03/25/18  1358 03/26/18  0404 03/27/18  0424   WBC 13.71* 8.09 7.20   HGB 12.1 10.6* 9.8*   HCT 36.8* 33.3* 29.8*    209 211    and INR   Recent Labs  Lab 03/25/18  1358   INR 1.1       Significant Imaging: Echocardiogram:   2D echo with color flow doppler:   Results for orders placed or performed during the hospital encounter of 03/25/18   2D echo with color flow doppler   Result Value Ref Range    EF 65 55 - 65    Diastolic Dysfunction No     Est. PA Systolic Pressure 24.16     Tricuspid Valve Regurgitation TRIVIAL       EKG (03/25/2018): NSR

## 2018-03-27 NOTE — NURSING TRANSFER
Nursing Transfer Note      3/27/2018     Transfer to: 948    Transfer via: Stretcher    Transfer with: IV Pump    Transported by: RN    Medicines sent: Yes    Chart send with patient: Yes    Notified: {Report called to MARYANNE Rock    Patient reassessed at: 1200

## 2018-03-27 NOTE — PT/OT/SLP PROGRESS
Occupational Therapy      Patient Name:  Kimberlee Ling   MRN:  [id number]    Patient not seen today secondary to Unavailable (Comment) (LIZET). Writing therapist attempted pt in AM, but pt off the floor at LIZET. Will follow-up as scheduled.    Navi Tian, OT  3/27/2018

## 2018-03-27 NOTE — H&P
[facility]  Cardiology  History and Physical     Patient Name: Kimberlee Ling  MRN: [id number]  Admission Date: 3/25/2018  Code Status: Full Code   Attending Provider: Corinne Perry MD   Primary Care Physician: Primary Doctor No  Principal Problem:Sepsis    Patient information was obtained from patient, past medical records and ER records.     Subjective:     Chief Complaint:  bacteremia     HPI:  Ms. Ling is a 33 yoF with PMHx of IVDU and Hep C who presents with 2 days of fever, diffuse pain, SOB and loos bowel movements. She has multiple lesions over her face and arms. Her blood cultures grew strep and thus LIZET was requested to rule out endocarditis.    Dysphagia or odynophagia:  NO  Liver Disease, esophageal disease, or known varices:  NO  Upper GI Bleeding: NO  Snoring:  NO  Sleep Apnea:  NO  Prior neck surgery or radiation:  NO  Able to move neck in all directions:  YES  +1  History of anesthetic difficulties:  NO  Family history of anesthetic difficulties:  NO  Last oral intake:  >10 hrs ago    Mallampati Class:  3  ASA Score:  2    History reviewed. No pertinent past medical history.    History reviewed. No pertinent surgical history.    Review of patient's allergies indicates:  No Known Allergies    No current facility-administered medications on file prior to encounter.      No current outpatient prescriptions on file prior to encounter.     Family History     None        Social History Main Topics    Smoking status: Current Some Day Smoker     Packs/day: 0.50     Types: Cigarettes    Smokeless tobacco: Never Used    Alcohol use Not on file    Drug use: Yes     Frequency: 3.0 times per week     Types: IV      Comment: Heroin    Sexual activity: Not on file     Review of Systems   Constitution: Positive for fever and malaise/fatigue. Negative for chills.   HENT: Negative.    Cardiovascular: Positive for chest pain. Negative for dyspnea on exertion, irregular heartbeat,  leg swelling, orthopnea, palpitations and paroxysmal nocturnal dyspnea.   Respiratory: Positive for shortness of breath. Negative for wheezing.    Skin: Negative for color change and rash.   Musculoskeletal: Positive for myalgias. Negative for arthritis and back pain.   Gastrointestinal: Negative for abdominal pain, constipation, diarrhea and nausea.   Neurological: Negative for dizziness, numbness and paresthesias.   Psychiatric/Behavioral: Negative.      Objective:     Vital Signs (Most Recent):  Temp: 98.3 °F (36.8 °C) (03/27/18 0711)  Pulse: 88 (03/27/18 0711)  Resp: 16 (03/27/18 0711)  BP: (!) 126/92 (03/27/18 0711)  SpO2: 97 % (03/27/18 0711) Vital Signs (24h Range):  Temp:  [98.3 °F (36.8 °C)-100 °F (37.8 °C)] 98.3 °F (36.8 °C)  Pulse:  [70-88] 88  Resp:  [16-80] 16  SpO2:  [96 %-98 %] 97 %  BP: (110-138)/(67-92) 126/92     Weight: 68 kg (150 lb)  Body mass index is 27.44 kg/m².    SpO2: 97 %  O2 Device (Oxygen Therapy): room air    No intake or output data in the 24 hours ending 03/27/18 0945    Lines/Drains/Airways     Peripheral Intravenous Line                 Peripheral IV - Single Lumen 03/26/18 0623 1 day                Physical Exam    Significant Labs:   CMP   Recent Labs  Lab 03/25/18  1358 03/26/18  0404 03/27/18  0424   * 137 138   K 4.3 4.5 4.0   CL 97 104 108   CO2 23 26 24   * 116* 144*   BUN 7 11 10   CREATININE 0.7 1.0 0.7   CALCIUM 9.2 8.7 8.5*   PROT 8.0 6.4 6.0   ALBUMIN 3.5 2.7* 2.4*   BILITOT 0.9 0.4 0.2   ALKPHOS 66 60 55   AST 23 19 11   ALT 21 17 14   ANIONGAP 11 7* 6*   ESTGFRAFRICA >60.0 >60.0 >60.0   EGFRNONAA >60.0 >60.0 >60.0   , CBC   Recent Labs  Lab 03/25/18  1358 03/26/18  0404 03/27/18  0424   WBC 13.71* 8.09 7.20   HGB 12.1 10.6* 9.8*   HCT 36.8* 33.3* 29.8*    209 211    and INR   Recent Labs  Lab 03/25/18  1358   INR 1.1       Significant Imaging: Echocardiogram:   2D echo with color flow doppler:   Results for orders placed or performed during the  hospital encounter of 03/25/18   2D echo with color flow doppler   Result Value Ref Range    EF 65 55 - 65    Diastolic Dysfunction No     Est. PA Systolic Pressure 24.16     Tricuspid Valve Regurgitation TRIVIAL       EKG (03/25/2018): NSR     Assessment and Plan:     * Sepsis    LIZET for evaluation of endocarditis    -The risks, benefits & alternatives of the procedure were explained to the patient.    -The risks of transesophageal echo include but are not limited to:  Dental trauma, esophageal trauma/perforation, bleeding, laryngospasm/brochospasm, aspiration, sore throat/hoarseness, & dislodgement of the endotracheal tube/nasogastric tube (where applicable).    -The risks of moderate sedation include hypotension, respiratory depression, arrhythmias, bronchospasm, & death.    -Informed consent was obtained & the patient is agreeable to proceed with the procedure.                VTE Risk Mitigation         Ordered     IP VTE LOW RISK PATIENT  Once      03/25/18 1900          Libra Preciado MD  Cardiology   [facility]

## 2018-03-27 NOTE — SUBJECTIVE & OBJECTIVE
Interval History:   KYLEIGH. Started on methadone but still having diffuse pain and myalgias. Going for LIZET.     Review of Systems   Constitutional: Negative for appetite change.   Respiratory: Negative for shortness of breath.    Cardiovascular: Positive for chest pain.   Gastrointestinal: Negative for constipation.   Musculoskeletal: Positive for arthralgias.     Objective:     Vital Signs (Most Recent):  Temp: 99 °F (37.2 °C) (03/27/18 1054)  Pulse: 73 (03/27/18 1130)  Resp: 20 (03/27/18 1130)  BP: 116/70 (03/27/18 1130)  SpO2: 98 % (03/27/18 1130) Vital Signs (24h Range):  Temp:  [98.3 °F (36.8 °C)-100 °F (37.8 °C)] 99 °F (37.2 °C)  Pulse:  [70-88] 73  Resp:  [14-80] 20  SpO2:  [96 %-100 %] 98 %  BP: (107-138)/(59-92) 116/70     Weight: 68 kg (150 lb)  Body mass index is 27.44 kg/m².    Intake/Output Summary (Last 24 hours) at 03/27/18 1139  Last data filed at 03/27/18 1034   Gross per 24 hour   Intake              500 ml   Output                0 ml   Net              500 ml      Physical Exam   Constitutional: She is oriented to person, place, and time. No distress.   HENT:   Mouth/Throat: Oropharynx is clear and moist.   Eyes: Conjunctivae and EOM are normal. No scleral icterus.   Neck: Normal range of motion. No JVD present.   Cardiovascular: Normal rate, regular rhythm and normal heart sounds.    1/6 murmur LUSB   Pulmonary/Chest: Effort normal and breath sounds normal. She has no wheezes. She has no rales. She exhibits tenderness.   Abdominal: Soft. Bowel sounds are normal. She exhibits no distension. There is no tenderness.   Musculoskeletal: She exhibits no edema.   Neurological: She is alert and oriented to person, place, and time. No sensory deficit.   Skin: Skin is warm. She is diaphoretic.   Multiple pustule lesions and small areas of scabbing on face, extremities and back. Face with scabbing effacing chin.   Psychiatric: She has a normal mood and affect.       Significant Labs: All pertinent labs  within the past 24 hours have been reviewed.    Significant Imaging: I have reviewed all pertinent imaging results/findings within the past 24 hours.

## 2018-03-27 NOTE — ASSESSMENT & PLAN NOTE
Patient with tachycardia and fever up to 103.5 in setting of IVDU also has multiple pustules on exam and facial lesion consistent with impetigo  Procal 17.89, leukocytosis 13.71  Ddx includes IE vs GPC bacteremia  Currently CXR not consistent with PNA, influenza negative  Blood Cx x 2 drawn in the Ed, 2/2 bottles growing strep pyogenes, sens pending, repeated cultures 3/26  Given 30cc/kg zosyn and vanc in the ED  Bactroban for impetigo  Will DC vanc 3/27 and continue rocephin  TTE ordered, no vegetations, ordered LIZET  HIV testing ordered

## 2018-03-27 NOTE — ANESTHESIA PREPROCEDURE EVALUATION
03/27/2018  Kimberlee Ling is a 33 y.o., female.    Anesthesia Evaluation         Review of Systems      Physical Exam      Chest/Lungs:  Chest/Lungs Findings: Clear to auscultation     Heart/Vascular:  Heart Findings: Rate: Normal             Anesthesia Plan  Type of Anesthesia, risks & benefits discussed:  Anesthesia Type:  general  Patient's Preference: General  Intra-op Monitoring Plan: standard ASA monitors  Intra-op Monitoring Plan Comments: Standard ASA monitors.   Post Op Pain Control Plan: per primary service following discharge from PACU  Post Op Pain Control Plan Comments: Per primary service.     Induction:   IV  Beta Blocker:  Patient is not currently on a Beta-Blocker (No further documentation required).       Informed Consent: Patient understands risks and agrees with Anesthesia plan.  Questions answered. Anesthesia consent signed with patient.  ASA Score: 3     Day of Surgery Review of History & Physical:    H&P update referred to the surgeon.     Anesthesia Plan Notes: Chart reviewed, patient interviewed and examined.  The plan for general anesthesia was explained.  Questions were answered and the consent was signed.  Manohar GALVIN         Ready For Surgery From Anesthesia Perspective.

## 2018-03-27 NOTE — HPI
Ms. Ling is a 33 yoF with PMHx of IVDU and Hep C who presents with 2 days of fever, diffuse pain, SOB and loos bowel movements. She has multiple lesions over her face and arms. Her blood cultures grew strep and thus LIZET was requested to rule out endocarditis.    Dysphagia or odynophagia:  NO  Liver Disease, esophageal disease, or known varices:  NO  Upper GI Bleeding: NO  Snoring:  NO  Sleep Apnea:  NO  Prior neck surgery or radiation:  NO  Able to move neck in all directions:  YES  +1  History of anesthetic difficulties:  NO  Family history of anesthetic difficulties:  NO  Last oral intake:  >10 hrs ago    Mallampati Class:  3  ASA Score:  2

## 2018-03-27 NOTE — ANESTHESIA RELEASE NOTE
Anesthesia Release from PACU Note    Patient: Kimberlee [facility]    Procedure(s) Performed: Procedure(s) (LRB):  TRANSESOPHAGEAL ECHOCARDIOGRAM (LIZET) (N/A)    Anesthesia type: general    Post pain: Adequate analgesia    Post assessment: no apparent anesthetic complications, tolerated procedure well and no evidence of recall    Post vital signs:   Vitals:    03/27/18 1130   BP: 116/70   Pulse: 73   Resp: 20   Temp:         Level of consciousness: awake, alert  and oriented    Nausea/Vomiting: no nausea/no vomiting    Complications: none    Airway Patency: patent    Respiratory: unassisted, spontaneous ventilation    Cardiovascular: stable and blood pressure at baseline    Hydration: euvolemic

## 2018-03-27 NOTE — PT/OT/SLP PROGRESS
Physical Therapy      Patient Name:  Kimberlee Ling   MRN:  [id number]    Patient not seen today. Orders acknowledged. Attempt in AM patient off floor for LIZET. Patient off floor for majority of morning/afternoon. Will follow-up next scheduled visit.    Lyndon Willis III, PT   3/27/2018

## 2018-03-27 NOTE — PHYSICIAN QUERY
PT Name: Kimberlee Ling  MR #: [id number]    Physician Query Form - Cause and Effect Relationship Clarification      CDS/: Marisabel Fulton RN CCDS                Contact information: [email or web address]    This form is a permanent document in the medical record.     Query Date: March 27, 2018    By submitting this query, we are merely seeking further clarification of documentation. Please utilize your independent clinical judgment when addressing the question(s) below.    The Medical record contains the following:  Supporting Clinical Findings   Location in record   Sepsis  Patient with tachycardia and fever up to 103.5 in setting of IVDU also has multiple pustules on exam and facial lesion consistent with impetigo  Procal 17.89, leukocytosis 13.71   Blood Cx x 2 drawn in the Ed, 2/2 bottles growing strep, sens pending, repeated cultures 3/26                                                                                                                                                                                 PN 3/26    Streptococcus Pyogenes (Group A)                                                                                                                                                                                      Lab 3/26         Provider, please clarify if there is any correlation between Sepsis_ and _Streptococcus Pyogenes_.           Are the conditions:     [X  ] Due to or associated with each other     [  ] Unrelated to each other     [  ] Other (Please Specify): _________________________     [  ] Clinically Undetermined

## 2018-03-28 LAB
ALBUMIN SERPL BCP-MCNC: 2.6 G/DL
ALP SERPL-CCNC: 61 U/L
ALT SERPL W/O P-5'-P-CCNC: 14 U/L
ANION GAP SERPL CALC-SCNC: 10 MMOL/L
AST SERPL-CCNC: 10 U/L
BASOPHILS # BLD AUTO: 0.03 K/UL
BASOPHILS NFR BLD: 0.4 %
BILIRUB SERPL-MCNC: 0.2 MG/DL
BUN SERPL-MCNC: 10 MG/DL
CALCIUM SERPL-MCNC: 9.2 MG/DL
CHLORIDE SERPL-SCNC: 102 MMOL/L
CO2 SERPL-SCNC: 26 MMOL/L
CREAT SERPL-MCNC: 0.8 MG/DL
DIFFERENTIAL METHOD: ABNORMAL
EOSINOPHIL # BLD AUTO: 0.1 K/UL
EOSINOPHIL NFR BLD: 1 %
ERYTHROCYTE [DISTWIDTH] IN BLOOD BY AUTOMATED COUNT: 13.8 %
EST. GFR  (AFRICAN AMERICAN): >60 ML/MIN/1.73 M^2
EST. GFR  (NON AFRICAN AMERICAN): >60 ML/MIN/1.73 M^2
GLUCOSE SERPL-MCNC: 117 MG/DL
HCT VFR BLD AUTO: 32.7 %
HGB BLD-MCNC: 10.2 G/DL
IMM GRANULOCYTES # BLD AUTO: 0.07 K/UL
IMM GRANULOCYTES NFR BLD AUTO: 1 %
LYMPHOCYTES # BLD AUTO: 2.4 K/UL
LYMPHOCYTES NFR BLD: 34.9 %
MAGNESIUM SERPL-MCNC: 1.7 MG/DL
MCH RBC QN AUTO: 26.8 PG
MCHC RBC AUTO-ENTMCNC: 31.2 G/DL
MCV RBC AUTO: 86 FL
MONOCYTES # BLD AUTO: 0.5 K/UL
MONOCYTES NFR BLD: 7.7 %
NEUTROPHILS # BLD AUTO: 3.8 K/UL
NEUTROPHILS NFR BLD: 55 %
NRBC BLD-RTO: 0 /100 WBC
PHOSPHATE SERPL-MCNC: 4.6 MG/DL
PLATELET # BLD AUTO: 265 K/UL
PLATELET BLD QL SMEAR: ABNORMAL
PMV BLD AUTO: 9.5 FL
POTASSIUM SERPL-SCNC: 4.1 MMOL/L
PROT SERPL-MCNC: 6.7 G/DL
RBC # BLD AUTO: 3.81 M/UL
SODIUM SERPL-SCNC: 138 MMOL/L
WBC # BLD AUTO: 6.84 K/UL

## 2018-03-28 PROCEDURE — 84100 ASSAY OF PHOSPHORUS: CPT

## 2018-03-28 PROCEDURE — 86704 HEP B CORE ANTIBODY TOTAL: CPT

## 2018-03-28 PROCEDURE — 99232 SBSQ HOSP IP/OBS MODERATE 35: CPT | Mod: ,,, | Performed by: HOSPITALIST

## 2018-03-28 PROCEDURE — 25000003 PHARM REV CODE 250: Performed by: STUDENT IN AN ORGANIZED HEALTH CARE EDUCATION/TRAINING PROGRAM

## 2018-03-28 PROCEDURE — 63600175 PHARM REV CODE 636 W HCPCS: Performed by: INTERNAL MEDICINE

## 2018-03-28 PROCEDURE — 87040 BLOOD CULTURE FOR BACTERIA: CPT | Mod: 59

## 2018-03-28 PROCEDURE — 85025 COMPLETE CBC W/AUTO DIFF WBC: CPT

## 2018-03-28 PROCEDURE — 87350 HEPATITIS BE AG IA: CPT

## 2018-03-28 PROCEDURE — 25000003 PHARM REV CODE 250: Performed by: PSYCHIATRY & NEUROLOGY

## 2018-03-28 PROCEDURE — 11000001 HC ACUTE MED/SURG PRIVATE ROOM

## 2018-03-28 PROCEDURE — 83735 ASSAY OF MAGNESIUM: CPT

## 2018-03-28 PROCEDURE — 25000003 PHARM REV CODE 250: Performed by: HOSPITALIST

## 2018-03-28 PROCEDURE — 36415 COLL VENOUS BLD VENIPUNCTURE: CPT

## 2018-03-28 PROCEDURE — 25000003 PHARM REV CODE 250: Performed by: INTERNAL MEDICINE

## 2018-03-28 PROCEDURE — 97161 PT EVAL LOW COMPLEX 20 MIN: CPT

## 2018-03-28 PROCEDURE — 86707 HEPATITIS BE ANTIBODY: CPT

## 2018-03-28 PROCEDURE — 80053 COMPREHEN METABOLIC PANEL: CPT

## 2018-03-28 PROCEDURE — 97165 OT EVAL LOW COMPLEX 30 MIN: CPT

## 2018-03-28 RX ORDER — METHADONE HYDROCHLORIDE 5 MG/1
5 TABLET ORAL NIGHTLY PRN
Status: COMPLETED | OUTPATIENT
Start: 2018-03-28 | End: 2018-03-28

## 2018-03-28 RX ORDER — METHADONE HYDROCHLORIDE 5 MG/1
5 TABLET ORAL DAILY
Status: COMPLETED | OUTPATIENT
Start: 2018-03-28 | End: 2018-03-28

## 2018-03-28 RX ORDER — METHADONE HYDROCHLORIDE 5 MG/1
5 TABLET ORAL DAILY
Status: COMPLETED | OUTPATIENT
Start: 2018-03-29 | End: 2018-03-29

## 2018-03-28 RX ADMIN — IBUPROFEN 400 MG: 400 TABLET, FILM COATED ORAL at 05:03

## 2018-03-28 RX ADMIN — TIZANIDINE 4 MG: 2 TABLET ORAL at 08:03

## 2018-03-28 RX ADMIN — METHADONE HYDROCHLORIDE 5 MG: 5 TABLET ORAL at 09:03

## 2018-03-28 RX ADMIN — ACETAMINOPHEN 650 MG: 325 TABLET ORAL at 09:03

## 2018-03-28 RX ADMIN — MUPIROCIN: 20 OINTMENT TOPICAL at 09:03

## 2018-03-28 RX ADMIN — TIZANIDINE 4 MG: 2 TABLET ORAL at 05:03

## 2018-03-28 RX ADMIN — CEFTRIAXONE SODIUM 2 G: 2 INJECTION, POWDER, FOR SOLUTION INTRAMUSCULAR; INTRAVENOUS at 08:03

## 2018-03-28 NOTE — PLAN OF CARE
Problem: Patient Care Overview  Goal: Plan of Care Review  Outcome: Ongoing (interventions implemented as appropriate)  POC reviewed,no injury,med po for pain  at HS.

## 2018-03-28 NOTE — ASSESSMENT & PLAN NOTE
Reported per patient who does not have medical care, patient discovered 6 mos ago from needle exchange service; not currently being treated  Hep C RNA + and viral rna detected  Hep B surface antibody positive, ordered Hep e testing and Hep B core total

## 2018-03-28 NOTE — PLAN OF CARE
Problem: Occupational Therapy Goal  Goal: Occupational Therapy Goal  Outcome: Outcome(s) achieved Date Met: 03/28/18  Pt is (I). OT to d/c.    MAXIMINO Sharma  3/28/2018  Pager: [phone]

## 2018-03-28 NOTE — ASSESSMENT & PLAN NOTE
Patient with tachycardia and fever up to 103.5 in setting of IVDU also has multiple pustules on exam and facial lesion consistent with impetigo  Procal 17.89, leukocytosis 13.71  Ddx includes IE vs GPC bacteremia  Currently CXR not consistent with PNA, influenza negative  Blood Cx x 2 drawn in the Ed, 2/2 bottles growing strep pyogenes, repeated cultures 3/26 negative, giving IV PCN  Given 30cc/kg zosyn and vanc in the ED  Bactroban for impetigo  DC vanc 3/27 and continued rocephin; transitioned to IV PCN 3/28  TTE ordered, no vegetations, ordered LIZET, no vegetations  HIV testing ordered, negative

## 2018-03-28 NOTE — PT/OT/SLP EVAL
"Physical Therapy Evaluation and Discharge Note    Patient Name:  Kimberlee Ling   MRN:  [id number]    Recommendations:     Discharge Recommendations:  home   Discharge Equipment Recommendations: none   Barriers to discharge: None    Assessment:     Kimberlee Ling is a 33 y.o. female admitted with a medical diagnosis of Sepsis. Prior to admit, pt (I) with mobility and self care. Despite multiple medical issues currently, pt able to ambulate with current deficit being irregular (but stable) gait pattern, performed for compensation of R LE weakness per pt report.  At this time, patient is near functioning at their prior level of function and does not require further acute PT services.     Recent Surgery: Procedure(s) (LRB):  TRANSESOPHAGEAL ECHOCARDIOGRAM (LIZET) (N/A) 1 Day Post-Op    Plan:     During this hospitalization, patient does not require further acute PT services.  Please re-consult if situation changes.     Plan of Care Reviewed with: patient    Subjective     Communicated with nsg prior to session.  Patient found supine in bed upon PT entry to room, agreeable to evaluation.      Chief Complaint: R big toe weakness  Patient comments/goals: " I don't know why it is doing that" per pt during session.   Pain/Comfort:  · Pain Rating 1: 0/10    Patients cultural, spiritual, Islam conflicts given the current situation:      Living Environment:  Pt homeless prior to admit. Boyfriend currently in hospital with similar symptoms. (I) with mobility and self care prior.owns no DME.       Objective:     Patient found with: peripheral IV     General Precautions: Standard, fall   Orthopedic Precautions:N/A   Braces: N/A       Exams:  Cognitive Exam  Patient is oriented to Person, Place, Time and Situation and follows 100% of multi-step commands    Fine Motor Coordination    -       Intact   Postural Exam Patient presented with the following abnormalities:    -       No postural abnormalities identified "   Sensation    -       Intact  light/touch (B) LE     Skin Integrity/Edema     -       Skin integrity: Dry scar on (B) UE  -       Edema: None noted in (B) LE   R LE ROM WFL   R LE Strength  WFL, except 4/5 DF    L LE ROM WFL   L LE Strength  WFL       Functional Mobility  Bed Mobility  Scooting: independence  Supine to Sit: independence   Sit to Supine: independence    Transfers Sit to Stand:  independence with no AD for 1 trials     Gait Gait Distance: 200 ft while holding onto IV pole as needed  Assistance Level: supervision  Description: slowed harris with decr WB through R LE at times due to incr weakness and discomfort         Balance   Static Sitting independence   Dynamic Sitting independence   Static Standing supervision   Dynamic Standing supervision         AM-PAC 6 CLICK MOBILITY  Total Score:24       Therapeutic Activities and Exercises:   PT educated pt on the following  - role of PT  - PT POC (including frequency and duration while in hospital)  - discharge recommendation (home) and equipment needs (none)  - level of assistance currently req (1 person supervision)and safety precautions with ns staff   All questions and concerns answered and addressed. White board updated with pertinent information. Nsg notified.       Patient left supine with all lines intact and call button in reach.    GOALS:    Physical Therapy Goals     Not on file          Multidisciplinary Problems (Resolved)        Problem: Physical Therapy Goal    Goal Priority Disciplines Outcome Goal Variances Interventions   Physical Therapy Goal   (Resolved)     PT/OT, PT Outcome(s) achieved                     History:     History reviewed. No pertinent past medical history.    History reviewed. No pertinent surgical history.    Clinical Decision Making:     History  Co-morbidities and personal factors that may impact the plan of care Examination  Body Structures and Functions, activity limitations and participation restrictions that  may impact the plan of care Clinical Presentation   Decision Making/ Complexity Score   Co-morbidities:   [] Time since onset of injury / illness / exacerbation  [] Status of current condition  []Patient's cognitive status and safety concerns    [] Multiple Medical Problems (see med hx)  Personal Factors:   [] Patient's age  [] Prior Level of function   [x] Patient's home situation (environment and family support)  [] Patient's level of motivation  [] Expected progression of patient      HISTORY:(criteria)    [] 94878 - no personal factors/history    [x] 75351 - has 1-2 personal factor/comorbidity     [] 20406 - has >3 personal factor/comorbidity     Body Regions:  [] Objective examination findings  [] Head     []  Neck  [] Trunk   [] Upper Extremity  [] Lower Extremity    Body Systems:  [] For communication ability, affect, cognition, language, and learning style: the assessment of the ability to make needs known, consciousness, orientation (person, place, and time), expected emotional /behavioral responses, and learning preferences (eg, learning barriers, education  needs)  [] For the neuromuscular system: a general assessment of gross coordinated movement (eg, balance, gait, locomotion, transfers, and transitions) and motor function  (motor control and motor learning)  [] For the musculoskeletal system: the assessment of gross symmetry, gross range of motion, gross strength, height, and weight  [] For the integumentary system: the assessment of pliability(texture), presence of scar formation, skin color, and skin integrity  [] For cardiovascular/pulmonary system: the assessment of heart rate, respiratory rate, blood pressure, and edema     Activity limitations:    [] Patient's cognitive status and saf ety concerns          [x] Status of current condition      [] Weight bearing restriction  [] Cardiopulmunary Restriction    Participation Restrictions:   [] Goals and goal agreement with the patient     [] Rehab  potential (prognosis) and probable outcome      Examination of Body System: (criteria)    [x] 76581 - addressing 1-2 elements    [] 75553 - addressing a total of 3 or more elements     [] 92783 -  Addressing a total of 4 or more elements         Clinical Presentation: (criteria)  Stable - 54703     On examination of body system using standardized tests and measures patient presents with 1-2 elements from any of the following: body structures and functions, activity limitations, and/or participation restrictions.  Leading to a clinical presentation that is considered stable and/or uncomplicated                              Clinical Decision Making  (Eval Complexity):  Low- 71007     Time Tracking:     PT Received On: 03/28/18  PT Start Time: 0915     PT Stop Time: 0930  PT Total Time (min): 15 min     Billable Minutes: Evaluation 15      Marcy Mcmillan, PT  03/28/2018

## 2018-03-28 NOTE — ASSESSMENT & PLAN NOTE
Patient everyday heroin/fentanyl  Prn clonidine, loperamide, ibuprofen, and tizanidine for withdrawal symptoms  Consult to addiction psych who has started methadone with taper, plan for rehab patient amendable

## 2018-03-28 NOTE — SUBJECTIVE & OBJECTIVE
Interval History:   NAEON.     Review of Systems   Constitutional: Negative for appetite change.   Respiratory: Negative for shortness of breath.    Cardiovascular: Positive for chest pain.   Gastrointestinal: Negative for constipation.   Musculoskeletal: Positive for arthralgias.     Objective:     Vital Signs (Most Recent):  Temp: 98.3 °F (36.8 °C) (03/28/18 0900)  Pulse: 76 (03/28/18 0900)  Resp: 18 (03/28/18 0900)  BP: 134/89 (03/28/18 0900)  SpO2: 98 % (03/28/18 0900) Vital Signs (24h Range):  Temp:  [98.2 °F (36.8 °C)-98.9 °F (37.2 °C)] 98.3 °F (36.8 °C)  Pulse:  [68-93] 76  Resp:  [17-20] 18  SpO2:  [93 %-99 %] 98 %  BP: (122-148)/(74-90) 134/89     Weight: 68 kg (150 lb)  Body mass index is 27.44 kg/m².    Intake/Output Summary (Last 24 hours) at 03/28/18 1147  Last data filed at 03/28/18 0500   Gross per 24 hour   Intake              480 ml   Output                0 ml   Net              480 ml      Physical Exam   Constitutional: She is oriented to person, place, and time. No distress.   HENT:   Mouth/Throat: Oropharynx is clear and moist.   Eyes: Conjunctivae and EOM are normal. No scleral icterus.   Neck: Normal range of motion. No JVD present.   Cardiovascular: Normal rate, regular rhythm and normal heart sounds.    1/6 murmur LUSB   Pulmonary/Chest: Effort normal and breath sounds normal. She has no wheezes. She has no rales. She exhibits tenderness.   Abdominal: Soft. Bowel sounds are normal. She exhibits no distension. There is no tenderness.   Musculoskeletal: She exhibits no edema.   Neurological: She is alert and oriented to person, place, and time. No sensory deficit.   Skin: Skin is warm. She is not diaphoretic.   Multiple pustule lesions and small areas of scabbing on face, extremities and back. Face with scabbing prominent on chin.   Psychiatric: She has a normal mood and affect.       Significant Labs: All pertinent labs within the past 24 hours have been reviewed.    Significant Imaging: I  have reviewed all pertinent imaging results/findings within the past 24 hours.

## 2018-03-28 NOTE — PROGRESS NOTES
[facility] Hospital Medicine  Progress Note    Patient Name: Kimberlee Ling  MRN: [id number]  Patient Class: IP- Inpatient   Admission Date: 3/25/2018  Length of Stay: 3 days  Attending Physician: Corinne Perry MD  Primary Care Provider: Primary Doctor [facility] Medicine Team: [facility] [place] HOSP MED 4 Maribell Segundo MD    Subjective:     Principal Problem:Sepsis    HPI:  33yoF with PMHx of reported Hep C and IVDU presents with 2 day hx of fevers with associated SOB, sharp reproducible chest pain radiating to back, myalgias, and one loose bowel movement. Patient claims she has had flu like symptoms before but this different. Patient claims her and her boyfriend live in a tent and he has similar symptoms. Patient says she has noticed areas of pus throughout her arms and has been picking at her face and says that seems infected. Patient denies nausea, vomiting, cough, or dysuria. Patient uses heroin daily and injects cocaine once weekly, last used heroin yesterday. Patient claims she learned she had Hep C from needle exchange clinic 6 mos ago and has never received treatment.    Hospital Course:  Admitted to hospital medicine started on vanc and rocephin. 3/26 2/2 bottles growing strep. Obtained LIZET, no vegetation found 3/27. Patient transitioned to IV PCN for strep pyogenes.      Interval History:   NAEON.     Review of Systems   Constitutional: Negative for appetite change.   Respiratory: Negative for shortness of breath.    Cardiovascular: Positive for chest pain.   Gastrointestinal: Negative for constipation.   Musculoskeletal: Positive for arthralgias.     Objective:     Vital Signs (Most Recent):  Temp: 98.3 °F (36.8 °C) (03/28/18 0900)  Pulse: 76 (03/28/18 0900)  Resp: 18 (03/28/18 0900)  BP: 134/89 (03/28/18 0900)  SpO2: 98 % (03/28/18 0900) Vital Signs (24h Range):  Temp:  [98.2 °F (36.8 °C)-98.9 °F (37.2 °C)] 98.3 °F (36.8 °C)  Pulse:  [68-93] 76  Resp:  [17-20] 18  SpO2:  [93 %-99  %] 98 %  BP: (122-148)/(74-90) 134/89     Weight: 68 kg (150 lb)  Body mass index is 27.44 kg/m².    Intake/Output Summary (Last 24 hours) at 03/28/18 1147  Last data filed at 03/28/18 0500   Gross per 24 hour   Intake              480 ml   Output                0 ml   Net              480 ml      Physical Exam   Constitutional: She is oriented to person, place, and time. No distress.   HENT:   Mouth/Throat: Oropharynx is clear and moist.   Eyes: Conjunctivae and EOM are normal. No scleral icterus.   Neck: Normal range of motion. No JVD present.   Cardiovascular: Normal rate, regular rhythm and normal heart sounds.    1/6 murmur LUSB   Pulmonary/Chest: Effort normal and breath sounds normal. She has no wheezes. She has no rales. She exhibits tenderness.   Abdominal: Soft. Bowel sounds are normal. She exhibits no distension. There is no tenderness.   Musculoskeletal: She exhibits no edema.   Neurological: She is alert and oriented to person, place, and time. No sensory deficit.   Skin: Skin is warm. She is not diaphoretic.   Multiple pustule lesions and small areas of scabbing on face, extremities and back. Face with scabbing prominent on chin.   Psychiatric: She has a normal mood and affect.       Significant Labs: All pertinent labs within the past 24 hours have been reviewed.    Significant Imaging: I have reviewed all pertinent imaging results/findings within the past 24 hours.    Assessment/Plan:      * Sepsis    Patient with tachycardia and fever up to 103.5 in setting of IVDU also has multiple pustules on exam and facial lesion consistent with impetigo  Procal 17.89, leukocytosis 13.71  Ddx includes IE vs GPC bacteremia  Currently CXR not consistent with PNA, influenza negative  Blood Cx x 2 drawn in the Ed, 2/2 bottles growing strep pyogenes, repeated cultures 3/26 negative, giving IV PCN  Given 30cc/kg zosyn and vanc in the ED  Bactroban for impetigo  DC vanc 3/27 and continued rocephin; transitioned to IV  PCN 3/28  TTE ordered, no vegetations, ordered LIZET, no vegetations  HIV testing ordered, negative        Active intravenous drug use    Patient everyday heroin/fentanyl  Prn clonidine, loperamide, ibuprofen, and tizanidine for withdrawal symptoms  Consult to addiction psych who has started methadone with taper, plan for rehab patient amendable          Chest pain    Reproducible on exam  EKG without STEMI or depressions  No other cardiac risk factors other than IV cocaine use intermittently; however denies use this week   Likely MSK, prn ibuprofen ordered          Hepatitis C    Reported per patient who does not have medical care, patient discovered 6 mos ago from needle exchange service; not currently being treated  Hep C RNA + and viral rna detected  Hep B surface antibody positive, ordered Hep e testing and Hep B core total            VTE Risk Mitigation         Ordered     IP VTE LOW RISK PATIENT  Once      03/25/18 1900              Maribell Segundo MD  Department of Hospital Medicine   [facility]

## 2018-03-28 NOTE — PT/OT/SLP EVAL
Occupational Therapy   Evaluation and Discharge Note    Name: Kimberlee Ling  MRN: [id number]  Admitting Diagnosis:  Sepsis 1 Day Post-Op    Recommendations:     Discharge Recommendations: home  Discharge Equipment Recommendations:  none  Barriers to discharge:  None    History:     Occupational Profile:  Living Environment: Pt lives with boyfriend  Previous level of function: (I)  Roles and Routines: girlfriend, friend  Equipment Owned:  none  Assistance upon Discharge: not available or unknown    History reviewed. No pertinent past medical history.    History reviewed. No pertinent surgical history.    Subjective     Chief Complaint: none  Patient/Family stated goals: get better and go home  Communicated with: RN prior to session.  Pain/Comfort:  · Pain Rating 1: 0/10  · Pain Rating Post-Intervention 1: 0/10    Patients cultural, spiritual, Jainism conflicts given the current situation: none stated    Objective:     Patient found with:  (none)    General Precautions: Standard, fall, contact   Orthopedic Precautions:N/A   Braces: N/A     Occupational Performance:    Bed Mobility:    · Patient completed Supine to Sit with independence  · Patient completed Sit to Supine with independence    Functional Mobility/Transfers:  · Patient completed Sit <> Stand Transfer with independence  with  no assistive device   · Patient completed Bed <> Chair Transfer using Stand Pivot technique with independence with no assistive device  · Patient completed Toilet Transfer Stand Pivot technique with independence with  no AD  · Functional Mobility: (I) no AD    Activities of Daily Living:  · Grooming: independence    · UB Dressing: independence    · LB Dressing: independence    · Toileting: independence      Cognitive/Visual Perceptual:  Cognitive/Psychosocial Skills:     -       Oriented to: Person, Place, Time and Situation   -       Follows Commands/attention:Follows multistep  commands  -       Communication: clear/fluent  -  "      Memory: No Deficits noted  -       Safety awareness/insight to disability: intact   -       Mood/Affect/Coping skills/emotional control: Appropriate to situation  Visual/Perceptual:      -Intact    Physical Exam:  Balance:    -       Good  Postural examination/scapula alignment:    -       No postural abnormalities identified  Dominant hand:    -       R  Upper Extremity Range of Motion:     -       Right Upper Extremity: WFL  -       Left Upper Extremity: WFL  Upper Extremity Strength:    -       Right Upper Extremity: WFL  -       Left Upper Extremity: WFL   Strength:    -       Right Upper Extremity: WFL  -       Left Upper Extremity: WFL  Fine Motor Coordination:    -       Intact  Gross motor coordination:   WFL    Patient left in [facility] room with RN notified    [facility] 6 Click:  [facility] Total Score: 24    Treatment & Education:  Pt ed re OT role and POC. Pt is (I).  Education:    Assessment:     Kimberlee Ling is a 33 y.o. female with a medical diagnosis of Sepsis. At this time, patient is functioning at their prior level of function and does not require further acute OT services.     Clinical Decision Makin.  OT Low:  "Pt evaluation falls under low complexity for evaluation coding due to performance deficits noted in 1-3 areas as stated above and 0 co-morbities affecting current functional status. Data obtained from problem focused assessments. No modifications or assistance was required for completion of evaluation. Only brief occupational profile and history review completed."     Plan:     During this hospitalization, patient does not require further acute OT services.  Please re-consult if situation changes.    · Plan of Care Reviewed with: patient    This Plan of care has been discussed with the patient who was involved in its development and understands and is in agreement with the identified goals and treatment plan    GOALS:    Occupational Therapy Goals     Not on " file          Multidisciplinary Problems (Resolved)        Problem: Occupational Therapy Goal    Goal Priority Disciplines Outcome Interventions   Occupational Therapy Goal   (Resolved)     OT, PT/OT Outcome(s) achieved                    Time Tracking:     OT Date of Treatment: 03/28/18  OT Start Time: 1115  OT Stop Time: 1123  OT Total Time (min): 8 min    Billable Minutes:Evaluation 8 minutes    MAXIMINO Sharma  3/28/2018  Pager: [phone]

## 2018-03-28 NOTE — PLAN OF CARE
Problem: Physical Therapy Goal  Goal: Physical Therapy Goal  Outcome: Outcome(s) achieved Date Met: 03/28/18  PT evaluation completed. No further acute PT services needed.    Marcy Mcmillan, PT, DPT  3/28/2018

## 2018-03-29 LAB
BACTERIA BLD CULT: NORMAL
HBV CORE AB SERPL QL IA: NEGATIVE

## 2018-03-29 PROCEDURE — A9585 GADOBUTROL INJECTION: HCPCS | Performed by: HOSPITALIST

## 2018-03-29 PROCEDURE — 63600175 PHARM REV CODE 636 W HCPCS: Performed by: HOSPITALIST

## 2018-03-29 PROCEDURE — 94761 N-INVAS EAR/PLS OXIMETRY MLT: CPT

## 2018-03-29 PROCEDURE — 11000001 HC ACUTE MED/SURG PRIVATE ROOM

## 2018-03-29 PROCEDURE — 25000003 PHARM REV CODE 250: Performed by: STUDENT IN AN ORGANIZED HEALTH CARE EDUCATION/TRAINING PROGRAM

## 2018-03-29 PROCEDURE — 36415 COLL VENOUS BLD VENIPUNCTURE: CPT

## 2018-03-29 PROCEDURE — 25000003 PHARM REV CODE 250: Performed by: PSYCHIATRY & NEUROLOGY

## 2018-03-29 PROCEDURE — 63600175 PHARM REV CODE 636 W HCPCS: Performed by: INTERNAL MEDICINE

## 2018-03-29 PROCEDURE — 99232 SBSQ HOSP IP/OBS MODERATE 35: CPT | Mod: ,,, | Performed by: HOSPITALIST

## 2018-03-29 PROCEDURE — 25000003 PHARM REV CODE 250: Performed by: INTERNAL MEDICINE

## 2018-03-29 PROCEDURE — 25500020 PHARM REV CODE 255: Performed by: HOSPITALIST

## 2018-03-29 PROCEDURE — 87040 BLOOD CULTURE FOR BACTERIA: CPT | Mod: 59

## 2018-03-29 RX ORDER — VANCOMYCIN/0.9 % SOD CHLORIDE 1 G/100 ML
15 PLASTIC BAG, INJECTION (ML) INTRAVENOUS
Status: DISCONTINUED | OUTPATIENT
Start: 2018-03-29 | End: 2018-03-29

## 2018-03-29 RX ORDER — VANCOMYCIN/0.9 % SOD CHLORIDE 1 G/100 ML
1000 PLASTIC BAG, INJECTION (ML) INTRAVENOUS
Status: DISCONTINUED | OUTPATIENT
Start: 2018-03-29 | End: 2018-03-31

## 2018-03-29 RX ORDER — GADOBUTROL 604.72 MG/ML
7 INJECTION INTRAVENOUS
Status: COMPLETED | OUTPATIENT
Start: 2018-03-29 | End: 2018-03-29

## 2018-03-29 RX ORDER — KETOROLAC TROMETHAMINE 10 MG/1
10 TABLET, FILM COATED ORAL ONCE
Status: COMPLETED | OUTPATIENT
Start: 2018-03-29 | End: 2018-03-29

## 2018-03-29 RX ADMIN — KETOROLAC TROMETHAMINE 10 MG: 10 TABLET, FILM COATED ORAL at 11:03

## 2018-03-29 RX ADMIN — Medication 1 G: at 10:03

## 2018-03-29 RX ADMIN — DEXTROSE 20 MILLION UNITS: 5 SOLUTION INTRAVENOUS at 08:03

## 2018-03-29 RX ADMIN — TIZANIDINE 4 MG: 2 TABLET ORAL at 08:03

## 2018-03-29 RX ADMIN — Medication 1 G: at 09:03

## 2018-03-29 RX ADMIN — GADOBUTROL 7 ML: 604.72 INJECTION INTRAVENOUS at 10:03

## 2018-03-29 RX ADMIN — METHADONE HYDROCHLORIDE 5 MG: 5 TABLET ORAL at 08:03

## 2018-03-29 RX ADMIN — MUPIROCIN: 20 OINTMENT TOPICAL at 08:03

## 2018-03-29 NOTE — ASSESSMENT & PLAN NOTE
Patient with tachycardia and fever up to 103.5 in setting of IVDU also has multiple pustules on exam and facial lesion consistent with impetigo  Procal 17.89, leukocytosis 13.71  Ddx includes IE vs GPC bacteremia  Currently CXR not consistent with PNA, influenza negative  Given 30cc/kg zosyn and vanc in the ED  Bactroban for impetigo  DC vanc 3/27 and continued rocephin; transitioned to IV PCN 3/28  TTE ordered, no vegetations, ordered LIZET, no vegetations  HIV testing ordered, negative  Blood Cx x 2 drawn in the Ed, 2/2 bottles growing strep pyogenes, repeated cultures 3/26 negative, gave IV PCN for one day, MRSA in cultures from 3/25 resulted 3/29, started IV vancomycin, end date April 9, 2018

## 2018-03-29 NOTE — PROGRESS NOTES
[facility] Hospital Medicine  Progress Note    Patient Name: Kimberlee Ling  MRN: [id number]  Patient Class: IP- Inpatient   Admission Date: 3/25/2018  Length of Stay: 4 days  Attending Physician: Corinne Perry MD  Primary Care Provider: Primary Doctor [facility] Medicine Team: [facility] HOSP MED 4 Maribell Segundo MD    Subjective:     Principal Problem:Sepsis    HPI:  33yoF with PMHx of reported Hep C and IVDU presents with 2 day hx of fevers with associated SOB, sharp reproducible chest pain radiating to back, myalgias, and one loose bowel movement. Patient claims she has had flu like symptoms before but this different. Patient claims her and her boyfriend live in a tent and he has similar symptoms. Patient says she has noticed areas of pus throughout her arms and has been picking at her face and says that seems infected. Patient denies nausea, vomiting, cough, or dysuria. Patient uses heroin daily and injects cocaine once weekly, last used heroin yesterday. Patient claims she learned she had Hep C from needle exchange clinic 6 mos ago and has never received treatment.    Hospital Course:  Admitted to hospital medicine started on vanc and rocephin. 3/26 2/2 bottles growing strep. Obtained LIZET, no vegetation found 3/27. Patient transitioned to IV PCN for strep pyogenes 3/28; however grew MRSA from blood cultures from 3/25, repeated cultures 3/29 and started patient on vancomycin. Patient placed on methadone taper per addiction psych completed 3/29.      Interval History:   Patient with diaphoresis and subjective chills required prn methadone x 1.    Review of Systems   Constitutional: Positive for chills and diaphoresis. Negative for appetite change.   Eyes: Negative for visual disturbance.   Respiratory: Negative for shortness of breath.    Cardiovascular: Positive for chest pain.   Gastrointestinal: Negative for constipation.   Genitourinary: Negative for dysuria and hematuria.    Musculoskeletal: Positive for arthralgias.   Neurological: Negative for numbness and headaches.   Psychiatric/Behavioral: Negative for confusion and hallucinations.     Objective:     Vital Signs (Most Recent):  Temp: 98.4 °F (36.9 °C) (03/29/18 0726)  Pulse: 72 (03/29/18 0726)  Resp: 18 (03/29/18 0726)  BP: (!) 134/92 (03/29/18 0726)  SpO2: 99 % (03/29/18 0726) Vital Signs (24h Range):  Temp:  [97.5 °F (36.4 °C)-98.5 °F (36.9 °C)] 98.4 °F (36.9 °C)  Pulse:  [65-86] 72  Resp:  [18] 18  SpO2:  [96 %-99 %] 99 %  BP: (119-138)/(72-92) 134/92     Weight: 68 kg (150 lb)  Body mass index is 27.44 kg/m².    Intake/Output Summary (Last 24 hours) at 03/29/18 1136  Last data filed at 03/28/18 1435   Gross per 24 hour   Intake              480 ml   Output                0 ml   Net              480 ml      Physical Exam   Constitutional: She is oriented to person, place, and time. No distress.   HENT:   Mouth/Throat: Oropharynx is clear and moist.   Eyes: Conjunctivae and EOM are normal. No scleral icterus.   Neck: Normal range of motion. No JVD present.   Cardiovascular: Normal rate, regular rhythm and normal heart sounds.  Exam reveals no gallop and no friction rub.    No murmur heard.  Pulmonary/Chest: Effort normal and breath sounds normal. She has no wheezes. She has no rales. She exhibits tenderness.   Abdominal: Soft. Bowel sounds are normal. She exhibits no distension. There is no tenderness.   Musculoskeletal: She exhibits no edema.   Neurological: She is alert and oriented to person, place, and time. No sensory deficit.   Skin: Skin is warm. She is not diaphoretic.   Multiple pustule lesions and small areas of scabbing on face, extremities and back. Face with scabbing prominent on chin.   Psychiatric: She has a normal mood and affect.       Significant Labs: All pertinent labs within the past 24 hours have been reviewed.    Significant Imaging: I have reviewed all pertinent imaging results/findings within the past  24 hours.    Assessment/Plan:      * Sepsis    Patient with tachycardia and fever up to 103.5 in setting of IVDU also has multiple pustules on exam and facial lesion consistent with impetigo  Procal 17.89, leukocytosis 13.71  Ddx includes IE vs GPC bacteremia  Currently CXR not consistent with PNA, influenza negative  Given 30cc/kg zosyn and vanc in the ED  Bactroban for impetigo  DC vanc 3/27 and continued rocephin; transitioned to IV PCN 3/28  TTE ordered, no vegetations, ordered LIZET, no vegetations  HIV testing ordered, negative  Blood Cx x 2 drawn in the Ed, 2/2 bottles growing strep pyogenes, repeated cultures 3/26 negative, gave IV PCN for one day, MRSA in cultures from 3/25 resulted 3/29, started IV vancomycin, end date April 9, 2018          Active intravenous drug use    Patient everyday heroin/fentanyl  Prn clonidine, loperamide, ibuprofen, and tizanidine for withdrawal symptoms  Consult to addiction psych who has started methadone with taper completed taper 3/29, plan for rehab patient amendable          Chest pain    Reproducible on exam  EKG without STEMI or depressions  No other cardiac risk factors other than IV cocaine use intermittently; however denies use this week   Likely MSK, prn ibuprofen ordered          Hepatitis C    Reported per patient who does not have medical care, patient discovered 6 mos ago from needle exchange service; not currently being treated  Hep C RNA + and viral rna detected  Hep B surface antibody positive, ordered Hep e testing and Hep B core total            VTE Risk Mitigation         Ordered     IP VTE LOW RISK PATIENT  Once      03/25/18 1900              Maribell Segundo MD  Department of Hospital Medicine   [facility]

## 2018-03-29 NOTE — ASSESSMENT & PLAN NOTE
Patient everyday heroin/fentanyl  Prn clonidine, loperamide, ibuprofen, and tizanidine for withdrawal symptoms  Consult to addiction psych who has started methadone with taper completed taper 3/29, plan for rehab patient amendable

## 2018-03-29 NOTE — SUBJECTIVE & OBJECTIVE
Interval History:   Patient with diaphoresis and subjective chills required prn methadone x 1.    Review of Systems   Constitutional: Positive for chills and diaphoresis. Negative for appetite change.   Eyes: Negative for visual disturbance.   Respiratory: Negative for shortness of breath.    Cardiovascular: Positive for chest pain.   Gastrointestinal: Negative for constipation.   Genitourinary: Negative for dysuria and hematuria.   Musculoskeletal: Positive for arthralgias.   Neurological: Negative for numbness and headaches.   Psychiatric/Behavioral: Negative for confusion and hallucinations.     Objective:     Vital Signs (Most Recent):  Temp: 98.4 °F (36.9 °C) (03/29/18 0726)  Pulse: 72 (03/29/18 0726)  Resp: 18 (03/29/18 0726)  BP: (!) 134/92 (03/29/18 0726)  SpO2: 99 % (03/29/18 0726) Vital Signs (24h Range):  Temp:  [97.5 °F (36.4 °C)-98.5 °F (36.9 °C)] 98.4 °F (36.9 °C)  Pulse:  [65-86] 72  Resp:  [18] 18  SpO2:  [96 %-99 %] 99 %  BP: (119-138)/(72-92) 134/92     Weight: 68 kg (150 lb)  Body mass index is 27.44 kg/m².    Intake/Output Summary (Last 24 hours) at 03/29/18 1136  Last data filed at 03/28/18 1435   Gross per 24 hour   Intake              480 ml   Output                0 ml   Net              480 ml      Physical Exam   Constitutional: She is oriented to person, place, and time. No distress.   HENT:   Mouth/Throat: Oropharynx is clear and moist.   Eyes: Conjunctivae and EOM are normal. No scleral icterus.   Neck: Normal range of motion. No JVD present.   Cardiovascular: Normal rate, regular rhythm and normal heart sounds.  Exam reveals no gallop and no friction rub.    No murmur heard.  Pulmonary/Chest: Effort normal and breath sounds normal. She has no wheezes. She has no rales. She exhibits tenderness.   Abdominal: Soft. Bowel sounds are normal. She exhibits no distension. There is no tenderness.   Musculoskeletal: She exhibits no edema.   Neurological: She is alert and oriented to person,  place, and time. No sensory deficit.   Skin: Skin is warm. She is not diaphoretic.   Multiple pustule lesions and small areas of scabbing on face, extremities and back. Face with scabbing prominent on chin.   Psychiatric: She has a normal mood and affect.       Significant Labs: All pertinent labs within the past 24 hours have been reviewed.    Significant Imaging: I have reviewed all pertinent imaging results/findings within the past 24 hours.

## 2018-03-29 NOTE — CONSULTS
"Inpatient consult to Physical Medicine Rehab  Consult performed by: SHO PIERCE  Consult ordered by: DIANA MURRAY  Reason for consult: per hospital medicine "hx of IVDU has GPC bacteremia"        After chart review, patient is (I) with bed mobility, sit to stand, ADLs, and ambulated 200 ft SV.  She is very high level and does not qualify for inpatient rehab.  Will sign off.  Please call with questions/concerns or re-consult if situation changes.    JOANNE Quintana, FNP-C  Physical Medicine & Rehabilitation   03/29/2018  Spectralink: [phone]    "

## 2018-03-30 LAB
HBV E AB SER QL: NORMAL
HBV E AG SPEC QL: NORMAL
VANCOMYCIN TROUGH SERPL-MCNC: 5.1 UG/ML

## 2018-03-30 PROCEDURE — 80202 ASSAY OF VANCOMYCIN: CPT

## 2018-03-30 PROCEDURE — 63600175 PHARM REV CODE 636 W HCPCS: Performed by: HOSPITALIST

## 2018-03-30 PROCEDURE — 12000002 HC ACUTE/MED SURGE SEMI-PRIVATE ROOM

## 2018-03-30 PROCEDURE — 63600175 PHARM REV CODE 636 W HCPCS: Performed by: INTERNAL MEDICINE

## 2018-03-30 PROCEDURE — 25500020 PHARM REV CODE 255: Performed by: HOSPITALIST

## 2018-03-30 PROCEDURE — 25000003 PHARM REV CODE 250: Performed by: STUDENT IN AN ORGANIZED HEALTH CARE EDUCATION/TRAINING PROGRAM

## 2018-03-30 PROCEDURE — 36415 COLL VENOUS BLD VENIPUNCTURE: CPT

## 2018-03-30 PROCEDURE — 94761 N-INVAS EAR/PLS OXIMETRY MLT: CPT

## 2018-03-30 PROCEDURE — 99232 SBSQ HOSP IP/OBS MODERATE 35: CPT | Mod: ,,, | Performed by: HOSPITALIST

## 2018-03-30 RX ORDER — KETOROLAC TROMETHAMINE 10 MG/1
10 TABLET, FILM COATED ORAL ONCE
Status: DISCONTINUED | OUTPATIENT
Start: 2018-03-30 | End: 2018-03-30

## 2018-03-30 RX ORDER — KETOROLAC TROMETHAMINE 30 MG/ML
30 INJECTION, SOLUTION INTRAMUSCULAR; INTRAVENOUS EVERY 6 HOURS PRN
Status: DISPENSED | OUTPATIENT
Start: 2018-03-30 | End: 2018-04-02

## 2018-03-30 RX ADMIN — TIZANIDINE 4 MG: 2 TABLET ORAL at 10:03

## 2018-03-30 RX ADMIN — SODIUM CHLORIDE 1000 ML: 0.9 INJECTION, SOLUTION INTRAVENOUS at 11:03

## 2018-03-30 RX ADMIN — MUPIROCIN: 20 OINTMENT TOPICAL at 09:03

## 2018-03-30 RX ADMIN — IOHEXOL 75 ML: 350 INJECTION, SOLUTION INTRAVENOUS at 04:03

## 2018-03-30 RX ADMIN — Medication 1 G: at 09:03

## 2018-03-30 RX ADMIN — KETOROLAC TROMETHAMINE 30 MG: 30 INJECTION, SOLUTION INTRAMUSCULAR at 10:03

## 2018-03-30 NOTE — PLAN OF CARE
Problem: Patient Care Overview  Goal: Plan of Care Review  Outcome: Ongoing (interventions implemented as appropriate)  POC reviewed, questions answered, concerns addressed. Patient noncompliant with isolation, she is to be moved to a room with her dontariend (909). In no acute distress; will continue to monitor.    Problem: Pain, Acute (Adult)  Goal: Acceptable Pain Control/Comfort Level  Patient will demonstrate the desired outcomes by discharge/transition of care.   Outcome: Ongoing (interventions implemented as appropriate)  Received Tordol and Xanaflex this am, no c/o pain since.    03/30/18 1823   Pain, Acute (Adult)   Acceptable Pain Control/Comfort Level making progress toward outcome       Problem: Infection, Risk/Actual (Adult)  Goal: Infection Prevention/Resolution  Patient will demonstrate the desired outcomes by discharge/transition of care.   Outcome: Ongoing (interventions implemented as appropriate)   03/30/18 1823   Infection, Risk/Actual (Adult)   Infection Prevention/Resolution making progress toward outcome       Problem: Fall Risk (Adult)  Goal: Absence of Falls  Patient will demonstrate the desired outcomes by discharge/transition of care.   Outcome: Ongoing (interventions implemented as appropriate)   03/30/18 1823   Fall Risk (Adult)   Absence of Falls making progress toward outcome

## 2018-03-30 NOTE — SUBJECTIVE & OBJECTIVE
Interval History:   Patient with diaphoresis and subjective chills and chest and back pain.    Review of Systems   Constitutional: Positive for chills and diaphoresis. Negative for appetite change.   Eyes: Negative for visual disturbance.   Respiratory: Negative for shortness of breath.    Cardiovascular: Positive for chest pain.   Gastrointestinal: Negative for constipation.   Genitourinary: Negative for dysuria and hematuria.   Musculoskeletal: Positive for arthralgias and back pain.   Neurological: Negative for numbness and headaches.   Psychiatric/Behavioral: Negative for confusion and hallucinations.     Objective:     Vital Signs (Most Recent):  Temp: 98.8 °F (37.1 °C) (03/30/18 0900)  Pulse: 69 (03/30/18 0900)  Resp: 16 (03/30/18 0900)  BP: 129/81 (03/30/18 0900)  SpO2: 98 % (03/30/18 0900) Vital Signs (24h Range):  Temp:  [97.8 °F (36.6 °C)-99.1 °F (37.3 °C)] 98.8 °F (37.1 °C)  Pulse:  [57-90] 69  Resp:  [16-18] 16  SpO2:  [97 %-99 %] 98 %  BP: (128-143)/(81-88) 129/81     Weight: 68 kg (150 lb)  Body mass index is 27.44 kg/m².    Intake/Output Summary (Last 24 hours) at 03/30/18 1148  Last data filed at 03/29/18 1446   Gross per 24 hour   Intake              320 ml   Output                0 ml   Net              320 ml      Physical Exam   Constitutional: She is oriented to person, place, and time. No distress.   HENT:   Mouth/Throat: Oropharynx is clear and moist.   Eyes: Conjunctivae and EOM are normal. No scleral icterus.   Neck: Normal range of motion. No JVD present.   Cardiovascular: Normal rate, regular rhythm and normal heart sounds.  Exam reveals no gallop and no friction rub.    No murmur heard.  Pulmonary/Chest: Effort normal and breath sounds normal. She has no wheezes. She has no rales. She exhibits tenderness.   Abdominal: Soft. Bowel sounds are normal. She exhibits no distension. There is no tenderness.   Musculoskeletal: She exhibits no edema.   Neurological: She is alert and oriented to  person, place, and time. No sensory deficit.   Skin: Skin is warm. She is not diaphoretic.   Multiple pustule lesions and small areas of scabbing on face, extremities and back. Face with scabbing prominent on chin (improved)   Psychiatric: She has a normal mood and affect.       Significant Labs: All pertinent labs within the past 24 hours have been reviewed.    Significant Imaging: I have reviewed all pertinent imaging results/findings within the past 24 hours.

## 2018-03-30 NOTE — ASSESSMENT & PLAN NOTE
Reproducible on exam  EKG without STEMI or depressions  No other cardiac risk factors other than IV cocaine use intermittently; however denies use the week of admission   MRI spine ordered 2/2 to recent MRSA isolated in blood from 3/25, negative for osteomyelitis, showed subsegmental opacities   CT chest ordered to further investigate  Likely MSK, prn toradol ordered for 3 days as non-improved for 3 days

## 2018-03-30 NOTE — PROGRESS NOTES
[facility] Hospital Medicine  Progress Note    Patient Name: Kimberlee Ling  MRN: [id number]  Patient Class: IP- Inpatient   Admission Date: 3/25/2018  Length of Stay: 5 days  Attending Physician: Corinne Perry MD  Primary Care Provider: Primary Doctor [facility] Medicine Team: [facility] [place] [facility] 4 Maribell Segundo MD    Subjective:     Principal Problem:Sepsis    HPI:  33yoF with PMHx of reported Hep C and IVDU presents with 2 day hx of fevers with associated SOB, sharp reproducible chest pain radiating to back, myalgias, and one loose bowel movement. Patient claims she has had flu like symptoms before but this different. Patient claims her and her boyfriend live in a tent and he has similar symptoms. Patient says she has noticed areas of pus throughout her arms and has been picking at her face and says that seems infected. Patient denies nausea, vomiting, cough, or dysuria. Patient uses heroin daily and injects cocaine once weekly, last used heroin yesterday. Patient claims she learned she had Hep C from needle exchange clinic 6 mos ago and has never received treatment.    Hospital Course:  Admitted to hospital medicine started on vanc and rocephin. 3/26 2/2 bottles growing strep. Obtained LIZET, no vegetation found 3/27. Patient transitioned to IV PCN for strep pyogenes 3/28; however grew MRSA from blood cultures from 3/25, repeated cultures 3/29 and started patient on vancomycin. Patient placed on methadone taper per addiction psych completed 3/29. Patient continues to have sharp chest and back pain on palpation and deep breathing. MRI spine ordered to investigate for osteomyelitis, unrevealing but for sugsegmental consolidation in lungs, ordered CT chest.      Interval History:   Patient with diaphoresis and subjective chills and chest and back pain.    Review of Systems   Constitutional: Positive for chills and diaphoresis. Negative for appetite change.   Eyes: Negative for visual  patient states food gets stuck in throat disturbance.   Respiratory: Negative for shortness of breath.    Cardiovascular: Positive for chest pain.   Gastrointestinal: Negative for constipation.   Genitourinary: Negative for dysuria and hematuria.   Musculoskeletal: Positive for arthralgias and back pain.   Neurological: Negative for numbness and headaches.   Psychiatric/Behavioral: Negative for confusion and hallucinations.     Objective:     Vital Signs (Most Recent):  Temp: 98.8 °F (37.1 °C) (03/30/18 0900)  Pulse: 69 (03/30/18 0900)  Resp: 16 (03/30/18 0900)  BP: 129/81 (03/30/18 0900)  SpO2: 98 % (03/30/18 0900) Vital Signs (24h Range):  Temp:  [97.8 °F (36.6 °C)-99.1 °F (37.3 °C)] 98.8 °F (37.1 °C)  Pulse:  [57-90] 69  Resp:  [16-18] 16  SpO2:  [97 %-99 %] 98 %  BP: (128-143)/(81-88) 129/81     Weight: 68 kg (150 lb)  Body mass index is 27.44 kg/m².    Intake/Output Summary (Last 24 hours) at 03/30/18 1148  Last data filed at 03/29/18 1446   Gross per 24 hour   Intake              320 ml   Output                0 ml   Net              320 ml      Physical Exam   Constitutional: She is oriented to person, place, and time. No distress.   HENT:   Mouth/Throat: Oropharynx is clear and moist.   Eyes: Conjunctivae and EOM are normal. No scleral icterus.   Neck: Normal range of motion. No JVD present.   Cardiovascular: Normal rate, regular rhythm and normal heart sounds.  Exam reveals no gallop and no friction rub.    No murmur heard.  Pulmonary/Chest: Effort normal and breath sounds normal. She has no wheezes. She has no rales. She exhibits tenderness.   Abdominal: Soft. Bowel sounds are normal. She exhibits no distension. There is no tenderness.   Musculoskeletal: She exhibits no edema.   Neurological: She is alert and oriented to person, place, and time. No sensory deficit.   Skin: Skin is warm. She is not diaphoretic.   Multiple pustule lesions and small areas of scabbing on face, extremities and back. Face with scabbing prominent on chin (improved)    Psychiatric: She has a normal mood and affect.       Significant Labs: All pertinent labs within the past 24 hours have been reviewed.    Significant Imaging: I have reviewed all pertinent imaging results/findings within the past 24 hours.    Assessment/Plan:      * Sepsis    Patient with tachycardia and fever up to 103.5 in setting of IVDU also has multiple pustules on exam and facial lesion consistent with impetigo  Procal 17.89, leukocytosis 13.71  Ddx includes IE vs GPC bacteremia  Currently CXR not consistent with PNA, influenza negative  Given 30cc/kg zosyn and vanc in the ED  Bactroban for impetigo  DC vanc 3/27 and continued rocephin; transitioned to IV PCN 3/28  TTE ordered, no vegetations, ordered LIZET, no vegetations  HIV testing ordered, negative  Blood Cx x 2 drawn in the Ed, 2/2 bottles growing strep pyogenes, repeated cultures 3/26 negative, gave IV PCN for one day, MRSA in cultures from 3/25 resulted 3/29, started IV vancomycin, end date April 9, 2018          Active intravenous drug use    Patient everyday heroin/fentanyl  Prn clonidine, loperamide, ibuprofen, and tizanidine for withdrawal symptoms  Consult to addiction psych who has started methadone with taper completed taper 3/29, plan for rehab patient amendable          Chest pain    Reproducible on exam  EKG without STEMI or depressions  No other cardiac risk factors other than IV cocaine use intermittently; however denies use the week of admission   MRI spine ordered 2/2 to recent MRSA isolated in blood from 3/25, negative for osteomyelitis, showed subsegmental opacities   CT chest ordered to further investigate  Likely MSK, prn toradol ordered for 3 days as non-improved for 3 days             Hepatitis C    Reported per patient who does not have medical care, patient discovered 6 mos ago from needle exchange service; not currently being treated  Hep C RNA + and viral rna detected  Hep B surface antibody positive, ordered Hep e testing and  Hep B core total, negative            VTE Risk Mitigation         Ordered     IP VTE LOW RISK PATIENT  Once      03/25/18 1900              Maribell Segundo MD  Department of Hospital Medicine   [facility]

## 2018-03-30 NOTE — NURSING
Patient just returned from having MRI done. IV Vancomycin infusing without difficulty. Patient is requesting for medication to relieve her pain. MT 4 contacted, one time dose of Ketoralac ordered.

## 2018-03-30 NOTE — ASSESSMENT & PLAN NOTE
Reported per patient who does not have medical care, patient discovered 6 mos ago from needle exchange service; not currently being treated  Hep C RNA + and viral rna detected  Hep B surface antibody positive, ordered Hep e testing and Hep B core total, negative

## 2018-03-31 LAB
ALBUMIN SERPL BCP-MCNC: 2.9 G/DL
ALP SERPL-CCNC: 49 U/L
ALT SERPL W/O P-5'-P-CCNC: 9 U/L
ANION GAP SERPL CALC-SCNC: 8 MMOL/L
AST SERPL-CCNC: 9 U/L
BACTERIA BLD CULT: NORMAL
BACTERIA BLD CULT: NORMAL
BASOPHILS # BLD AUTO: 0.04 K/UL
BASOPHILS NFR BLD: 0.5 %
BILIRUB SERPL-MCNC: 0.2 MG/DL
BUN SERPL-MCNC: 17 MG/DL
CALCIUM SERPL-MCNC: 9.1 MG/DL
CHLORIDE SERPL-SCNC: 104 MMOL/L
CO2 SERPL-SCNC: 25 MMOL/L
CREAT SERPL-MCNC: 0.7 MG/DL
DIFFERENTIAL METHOD: ABNORMAL
EOSINOPHIL # BLD AUTO: 0.1 K/UL
EOSINOPHIL NFR BLD: 1 %
ERYTHROCYTE [DISTWIDTH] IN BLOOD BY AUTOMATED COUNT: 13.2 %
EST. GFR  (AFRICAN AMERICAN): >60 ML/MIN/1.73 M^2
EST. GFR  (NON AFRICAN AMERICAN): >60 ML/MIN/1.73 M^2
GLUCOSE SERPL-MCNC: 121 MG/DL
HCT VFR BLD AUTO: 34.2 %
HGB BLD-MCNC: 11 G/DL
IMM GRANULOCYTES # BLD AUTO: 0.1 K/UL
IMM GRANULOCYTES NFR BLD AUTO: 1.1 %
LYMPHOCYTES # BLD AUTO: 1.8 K/UL
LYMPHOCYTES NFR BLD: 20.7 %
MAGNESIUM SERPL-MCNC: 2 MG/DL
MCH RBC QN AUTO: 27.2 PG
MCHC RBC AUTO-ENTMCNC: 32.2 G/DL
MCV RBC AUTO: 84 FL
MONOCYTES # BLD AUTO: 0.5 K/UL
MONOCYTES NFR BLD: 6.1 %
NEUTROPHILS # BLD AUTO: 6.2 K/UL
NEUTROPHILS NFR BLD: 70.6 %
NRBC BLD-RTO: 0 /100 WBC
PHOSPHATE SERPL-MCNC: 3.5 MG/DL
PLATELET # BLD AUTO: 318 K/UL
PMV BLD AUTO: 8.6 FL
POTASSIUM SERPL-SCNC: 4.2 MMOL/L
PROT SERPL-MCNC: 7.1 G/DL
RBC # BLD AUTO: 4.05 M/UL
SODIUM SERPL-SCNC: 137 MMOL/L
WBC # BLD AUTO: 8.81 K/UL

## 2018-03-31 PROCEDURE — 85025 COMPLETE CBC W/AUTO DIFF WBC: CPT

## 2018-03-31 PROCEDURE — 99232 SBSQ HOSP IP/OBS MODERATE 35: CPT | Mod: ,,, | Performed by: HOSPITALIST

## 2018-03-31 PROCEDURE — 25000003 PHARM REV CODE 250: Performed by: STUDENT IN AN ORGANIZED HEALTH CARE EDUCATION/TRAINING PROGRAM

## 2018-03-31 PROCEDURE — 63600175 PHARM REV CODE 636 W HCPCS: Performed by: INTERNAL MEDICINE

## 2018-03-31 PROCEDURE — 84100 ASSAY OF PHOSPHORUS: CPT

## 2018-03-31 PROCEDURE — 80053 COMPREHEN METABOLIC PANEL: CPT

## 2018-03-31 PROCEDURE — 83735 ASSAY OF MAGNESIUM: CPT

## 2018-03-31 PROCEDURE — 63600175 PHARM REV CODE 636 W HCPCS: Performed by: HOSPITALIST

## 2018-03-31 PROCEDURE — 12000002 HC ACUTE/MED SURGE SEMI-PRIVATE ROOM

## 2018-03-31 PROCEDURE — 36415 COLL VENOUS BLD VENIPUNCTURE: CPT

## 2018-03-31 RX ORDER — VANCOMYCIN/0.9 % SOD CHLORIDE 1 G/100 ML
1000 PLASTIC BAG, INJECTION (ML) INTRAVENOUS
Status: DISCONTINUED | OUTPATIENT
Start: 2018-03-31 | End: 2018-04-01

## 2018-03-31 RX ADMIN — KETOROLAC TROMETHAMINE 30 MG: 30 INJECTION, SOLUTION INTRAMUSCULAR at 08:03

## 2018-03-31 RX ADMIN — MUPIROCIN: 20 OINTMENT TOPICAL at 10:03

## 2018-03-31 RX ADMIN — KETOROLAC TROMETHAMINE 30 MG: 30 INJECTION, SOLUTION INTRAMUSCULAR at 10:03

## 2018-03-31 RX ADMIN — TIZANIDINE 4 MG: 2 TABLET ORAL at 08:03

## 2018-03-31 RX ADMIN — Medication 1 G: at 10:03

## 2018-03-31 RX ADMIN — TIZANIDINE 4 MG: 2 TABLET ORAL at 10:03

## 2018-03-31 RX ADMIN — Medication 1 G: at 06:03

## 2018-03-31 NOTE — PROGRESS NOTES
[facility] Hospital Medicine  Progress Note    Patient Name: Kimberlee Ling  MRN: [id number]  Patient Class: IP- Inpatient   Admission Date: 3/25/2018  Length of Stay: 6 days  Attending Physician: Corinne Perry MD  Primary Care Provider: Primary Doctor [facility] Medicine Team: [facility] [place] [facility] MED 4 Maribell Segundo MD    Subjective:     Principal Problem:Sepsis    HPI:  33yoF with PMHx of reported Hep C and IVDU presents with 2 day hx of fevers with associated SOB, sharp reproducible chest pain radiating to back, myalgias, and one loose bowel movement. Patient claims she has had flu like symptoms before but this different. Patient claims her and her boyfriend live in a tent and he has similar symptoms. Patient says she has noticed areas of pus throughout her arms and has been picking at her face and says that seems infected. Patient denies nausea, vomiting, cough, or dysuria. Patient uses heroin daily and injects cocaine once weekly, last used heroin yesterday. Patient claims she learned she had Hep C from needle exchange clinic 6 mos ago and has never received treatment.    Hospital Course:  Admitted to hospital medicine started on vanc and rocephin. 3/26 2/2 bottles growing strep. Obtained LIZET, no vegetation found 3/27. Patient transitioned to IV PCN for strep pyogenes 3/28; however grew MRSA from blood cultures from 3/25, repeated cultures 3/29 and started patient on vancomycin. Patient placed on methadone taper per addiction psych completed 3/29. Patient continues to have sharp chest and back pain on palpation and deep breathing. MRI spine ordered to investigate for osteomyelitis, unrevealing but for sugsegmental consolidation in lungs, ordered CT chest, found scattered nodules, with one 2.3 x 1.9 with concern for possible abscess. Will repeat CT near patient's discharge after abx completion.      Interval History:   Patient with improvement in chest pain. ordered CT chest, found  scattered nodules, with one 2.3 x 1.9 with concern for possible abscess. Will repeat CT near patient's discharge after abx completion.    Review of Systems   Constitutional: Negative for appetite change, chills and diaphoresis.   Eyes: Negative for visual disturbance.   Respiratory: Negative for shortness of breath.    Cardiovascular: Negative for chest pain.   Gastrointestinal: Negative for constipation.   Genitourinary: Negative for dysuria and hematuria.   Musculoskeletal: Negative for arthralgias and back pain.   Neurological: Negative for numbness and headaches.   Psychiatric/Behavioral: Negative for confusion and hallucinations.     Objective:     Vital Signs (Most Recent):  Temp: 97.7 °F (36.5 °C) (03/31/18 0853)  Pulse: 73 (03/31/18 0853)  Resp: 18 (03/31/18 0853)  BP: 119/83 (03/31/18 0853)  SpO2: 97 % (03/31/18 0853) Vital Signs (24h Range):  Temp:  [97.7 °F (36.5 °C)-98.1 °F (36.7 °C)] 97.7 °F (36.5 °C)  Pulse:  [72-75] 73  Resp:  [16-18] 18  SpO2:  [97 %-100 %] 97 %  BP: (119-133)/(67-96) 119/83     Weight: 68 kg (150 lb)  Body mass index is 27.44 kg/m².  No intake or output data in the 24 hours ending 03/31/18 1233   Physical Exam   Constitutional: She is oriented to person, place, and time. No distress.   HENT:   Mouth/Throat: Oropharynx is clear and moist.   Eyes: Conjunctivae and EOM are normal. No scleral icterus.   Neck: Normal range of motion. No JVD present.   Cardiovascular: Normal rate, regular rhythm and normal heart sounds.  Exam reveals no gallop and no friction rub.    No murmur heard.  Pulmonary/Chest: Effort normal and breath sounds normal. She has no wheezes. She has no rales. She exhibits tenderness.   Abdominal: Soft. Bowel sounds are normal. She exhibits no distension. There is no tenderness.   Musculoskeletal: She exhibits no edema.   Neurological: She is alert and oriented to person, place, and time. No sensory deficit.   Skin: Skin is warm. She is not diaphoretic.   Multiple  pustule lesions and small areas of scabbing on face, extremities and back. Face with scabbing prominent on chin (improved)   Psychiatric: She has a normal mood and affect.       Significant Labs: All pertinent labs within the past 24 hours have been reviewed.    Significant Imaging: I have reviewed all pertinent imaging results/findings within the past 24 hours.    Assessment/Plan:      * Sepsis    Patient with tachycardia and fever up to 103.5 in setting of IVDU also has multiple pustules on exam and facial lesion consistent with impetigo  Procal 17.89, leukocytosis 13.71  Ddx includes IE vs GPC bacteremia  Currently CXR not consistent with PNA, influenza negative  Given 30cc/kg zosyn and vanc in the ED  Bactroban for impetigo  DC vanc 3/27 and continued rocephin; transitioned to IV PCN 3/28  TTE ordered, no vegetations, ordered LIZET, no vegetations  HIV testing ordered, negative  Blood Cx x 2 drawn in the Ed, 2/2 bottles growing strep pyogenes, repeated cultures 3/26 negative, gave IV PCN for one day, MRSA in cultures from 3/25 resulted 3/29, started IV vancomycin, end date April 9, 2018          Active intravenous drug use    Patient everyday heroin/fentanyl  Prn clonidine, loperamide, ibuprofen, and tizanidine for withdrawal symptoms  Consult to addiction psych who has started methadone with taper completed taper 3/29, plan for rehab patient amendable          Chest pain    Reproducible on exam  EKG without STEMI or depressions  No other cardiac risk factors other than IV cocaine use intermittently; however denies use the week of admission   MRI spine ordered 2/2 to recent MRSA isolated in blood from 3/25, negative for osteomyelitis, showed subsegmental opacities   CT chest ordered to further investigate 3/30 found scattered nodules, with one 2.3 x 1.9 with concern for possible abscess. Will repeat CT near patient's discharge after abx completion.  Likely MSK, no evidence of skeletal disease on imagine, prn  toradol ordered for 3 days, improved today 3/31            Hepatitis C    Reported per patient who does not have medical care, patient discovered 6 mos ago from needle exchange service; not currently being treated  Hep C RNA + and viral rna detected  Hep B surface antibody positive, ordered Hep e testing and Hep B core total, negative            VTE Risk Mitigation         Ordered     IP VTE LOW RISK PATIENT  Once      03/25/18 1900              Maribell Segundo MD  Department of Hospital Medicine   [facility]

## 2018-03-31 NOTE — SUBJECTIVE & OBJECTIVE
Interval History:   Patient with improvement in chest pain. ordered CT chest, found scattered nodules, with one 2.3 x 1.9 with concern for possible abscess. Will repeat CT near patient's discharge after abx completion.    Review of Systems   Constitutional: Negative for appetite change, chills and diaphoresis.   Eyes: Negative for visual disturbance.   Respiratory: Negative for shortness of breath.    Cardiovascular: Negative for chest pain.   Gastrointestinal: Negative for constipation.   Genitourinary: Negative for dysuria and hematuria.   Musculoskeletal: Negative for arthralgias and back pain.   Neurological: Negative for numbness and headaches.   Psychiatric/Behavioral: Negative for confusion and hallucinations.     Objective:     Vital Signs (Most Recent):  Temp: 97.7 °F (36.5 °C) (03/31/18 0853)  Pulse: 73 (03/31/18 0853)  Resp: 18 (03/31/18 0853)  BP: 119/83 (03/31/18 0853)  SpO2: 97 % (03/31/18 0853) Vital Signs (24h Range):  Temp:  [97.7 °F (36.5 °C)-98.1 °F (36.7 °C)] 97.7 °F (36.5 °C)  Pulse:  [72-75] 73  Resp:  [16-18] 18  SpO2:  [97 %-100 %] 97 %  BP: (119-133)/(67-96) 119/83     Weight: 68 kg (150 lb)  Body mass index is 27.44 kg/m².  No intake or output data in the 24 hours ending 03/31/18 1233   Physical Exam   Constitutional: She is oriented to person, place, and time. No distress.   HENT:   Mouth/Throat: Oropharynx is clear and moist.   Eyes: Conjunctivae and EOM are normal. No scleral icterus.   Neck: Normal range of motion. No JVD present.   Cardiovascular: Normal rate, regular rhythm and normal heart sounds.  Exam reveals no gallop and no friction rub.    No murmur heard.  Pulmonary/Chest: Effort normal and breath sounds normal. She has no wheezes. She has no rales. She exhibits tenderness.   Abdominal: Soft. Bowel sounds are normal. She exhibits no distension. There is no tenderness.   Musculoskeletal: She exhibits no edema.   Neurological: She is alert and oriented to person, place, and time.  No sensory deficit.   Skin: Skin is warm. She is not diaphoretic.   Multiple pustule lesions and small areas of scabbing on face, extremities and back. Face with scabbing prominent on chin (improved)   Psychiatric: She has a normal mood and affect.       Significant Labs: All pertinent labs within the past 24 hours have been reviewed.    Significant Imaging: I have reviewed all pertinent imaging results/findings within the past 24 hours.

## 2018-03-31 NOTE — CONSULTS
SU at bedside for PICC placement.  Pt states she does not want PICC line and will go AMA before she does.  RN notified in turn notified Md. Re- consult as needed.

## 2018-03-31 NOTE — ASSESSMENT & PLAN NOTE
Reproducible on exam  EKG without STEMI or depressions  No other cardiac risk factors other than IV cocaine use intermittently; however denies use the week of admission   MRI spine ordered 2/2 to recent MRSA isolated in blood from 3/25, negative for osteomyelitis, showed subsegmental opacities   CT chest ordered to further investigate 3/30 found scattered nodules, with one 2.3 x 1.9 with concern for possible abscess. Will repeat CT near patient's discharge after abx completion.  Likely MSK, no evidence of skeletal disease on imagine, prn toradol ordered for 3 days, improved today 3/31

## 2018-03-31 NOTE — NURSING
mutiple attempts made to restart patient IV. Patient stated she wants to wait until morning time. IM on call MD notified and made aware about patient needing vancomycin IV

## 2018-04-01 PROBLEM — R78.81 GRAM-POSITIVE BACTEREMIA: Status: ACTIVE | Noted: 2018-03-25

## 2018-04-01 LAB
ANION GAP SERPL CALC-SCNC: 8 MMOL/L
BUN SERPL-MCNC: 18 MG/DL
CALCIUM SERPL-MCNC: 8.7 MG/DL
CHLORIDE SERPL-SCNC: 109 MMOL/L
CO2 SERPL-SCNC: 22 MMOL/L
CREAT SERPL-MCNC: 0.6 MG/DL
EST. GFR  (AFRICAN AMERICAN): >60 ML/MIN/1.73 M^2
EST. GFR  (NON AFRICAN AMERICAN): >60 ML/MIN/1.73 M^2
GLUCOSE SERPL-MCNC: 100 MG/DL
POTASSIUM SERPL-SCNC: 4.7 MMOL/L
SODIUM SERPL-SCNC: 139 MMOL/L
VANCOMYCIN TROUGH SERPL-MCNC: 6.9 UG/ML

## 2018-04-01 PROCEDURE — 36415 COLL VENOUS BLD VENIPUNCTURE: CPT

## 2018-04-01 PROCEDURE — 12000002 HC ACUTE/MED SURGE SEMI-PRIVATE ROOM

## 2018-04-01 PROCEDURE — 63600175 PHARM REV CODE 636 W HCPCS: Performed by: INTERNAL MEDICINE

## 2018-04-01 PROCEDURE — 80048 BASIC METABOLIC PNL TOTAL CA: CPT

## 2018-04-01 PROCEDURE — 25000003 PHARM REV CODE 250: Performed by: INTERNAL MEDICINE

## 2018-04-01 PROCEDURE — 99232 SBSQ HOSP IP/OBS MODERATE 35: CPT | Mod: ,,, | Performed by: HOSPITALIST

## 2018-04-01 PROCEDURE — 80202 ASSAY OF VANCOMYCIN: CPT

## 2018-04-01 PROCEDURE — 25000003 PHARM REV CODE 250: Performed by: STUDENT IN AN ORGANIZED HEALTH CARE EDUCATION/TRAINING PROGRAM

## 2018-04-01 RX ADMIN — Medication 1 G: at 12:04

## 2018-04-01 RX ADMIN — TIZANIDINE 4 MG: 2 TABLET ORAL at 09:04

## 2018-04-01 RX ADMIN — KETOROLAC TROMETHAMINE 30 MG: 30 INJECTION, SOLUTION INTRAMUSCULAR at 08:04

## 2018-04-01 RX ADMIN — KETOROLAC TROMETHAMINE 30 MG: 30 INJECTION, SOLUTION INTRAMUSCULAR at 09:04

## 2018-04-01 RX ADMIN — TIZANIDINE 4 MG: 2 TABLET ORAL at 08:04

## 2018-04-01 RX ADMIN — Medication 1 G: at 02:04

## 2018-04-01 RX ADMIN — VANCOMYCIN HYDROCHLORIDE 1750 MG: 10 INJECTION, POWDER, LYOPHILIZED, FOR SOLUTION INTRAVENOUS at 06:04

## 2018-04-01 RX ADMIN — MUPIROCIN: 20 OINTMENT TOPICAL at 09:04

## 2018-04-01 NOTE — SUBJECTIVE & OBJECTIVE
Interval History:   KYLEIGH. Moved to room with her boyfriend despite contact precautions- discussed that she may have to be  based on infection control policy. Patient asked if she would be able to finish PO abx as an outpatient. Discussed that the type of infection she has needs to be treated by IV.     Review of Systems   Constitutional: Negative for appetite change, chills and diaphoresis.   Eyes: Negative for visual disturbance.   Respiratory: Negative for shortness of breath.    Cardiovascular: Negative for chest pain.   Gastrointestinal: Negative for constipation.   Genitourinary: Negative for dysuria and hematuria.   Musculoskeletal: Positive for myalgias. Negative for arthralgias and back pain.   Neurological: Negative for numbness and headaches.   Psychiatric/Behavioral: Negative for confusion and hallucinations.     Objective:     Vital Signs (Most Recent):  Temp: 98.1 °F (36.7 °C) (04/01/18 0926)  Pulse: 78 (04/01/18 0926)  Resp: 16 (04/01/18 0926)  BP: 130/82 (04/01/18 0926)  SpO2: 98 % (04/01/18 0926) Vital Signs (24h Range):  Temp:  [97 °F (36.1 °C)-98.8 °F (37.1 °C)] 98.1 °F (36.7 °C)  Pulse:  [70-85] 78  Resp:  [16-18] 16  SpO2:  [94 %-98 %] 98 %  BP: (106-130)/(55-82) 130/82     Weight: 68 kg (150 lb)  Body mass index is 27.44 kg/m².  No intake or output data in the 24 hours ending 04/01/18 1100   Physical Exam   Constitutional: She is oriented to person, place, and time. No distress.   HENT:   Mouth/Throat: Oropharynx is clear and moist.   Eyes: Conjunctivae and EOM are normal. No scleral icterus.   Neck: Normal range of motion. No JVD present.   Cardiovascular: Normal rate, regular rhythm and normal heart sounds.  Exam reveals no gallop and no friction rub.    No murmur heard.  Pulmonary/Chest: Effort normal and breath sounds normal. She has no wheezes. She has no rales. She exhibits tenderness.   Abdominal: Soft. Bowel sounds are normal. She exhibits no distension. There is no tenderness.    Musculoskeletal: She exhibits no edema.   Neurological: She is alert and oriented to person, place, and time. No sensory deficit.   Skin: Skin is warm. She is not diaphoretic.   Multiple pustule lesions and small areas of scabbing on face, extremities and back. Face with scabbing prominent on chin (improved and almost resolved)   Psychiatric: She has a normal mood and affect.       Significant Labs: All pertinent labs within the past 24 hours have been reviewed.    Significant Imaging: I have reviewed all pertinent imaging results/findings within the past 24 hours.

## 2018-04-01 NOTE — ASSESSMENT & PLAN NOTE
Patient with tachycardia and fever up to 103.5 in setting of IVDU also has multiple pustules on exam and facial lesion consistent with impetigo  Procal 17.89, leukocytosis 13.71  Secondary to Group A strep and MRSA bacteremia  Currently CXR not consistent with PNA, influenza negative  Given 30cc/kg zosyn and vanc in the ED  Bactroban for impetigo to finish 4/2  DC vanc 3/27 and continued rocephin; transitioned to IV PCN 3/28. Vanc restarted given new growth of MRSA  TTE ordered, no vegetations, ordered LIZET, no vegetations  HIV testing ordered, negative  Blood Cx x 2 drawn in the Ed, 2/2 bottles growing strep pyogenes, repeated cultures 3/26 negative, gave IV PCN for one day, MRSA in cultures from 3/25 resulted 3/29, started IV vancomycin, end date April 9, 2018

## 2018-04-01 NOTE — NURSING
AAO x4, VSS, pain well controlled, ambulating independently. Voiding in bathroom. Continue IV abx. Consult PICC for possible midline placement on Monday.

## 2018-04-01 NOTE — PROGRESS NOTES
[facility] Hospital Medicine  Progress Note    Patient Name: Kimberlee Ling  MRN: [id number]  Patient Class: IP- Inpatient   Admission Date: 3/25/2018  Length of Stay: 7 days  Attending Physician: Corinne Perry MD  Primary Care Provider: Primary Doctor [facility] [place] [facility] [place] [place] Erin Bosch MD    Subjective:     Principal Problem:Gram-positive bacteremia    HPI:  33yoF with PMHx of reported Hep C and IVDU presents with 2 day hx of fevers with associated SOB, sharp reproducible chest pain radiating to back, myalgias, and one loose bowel movement. Patient claims she has had flu like symptoms before but this different. Patient claims her and her boyfriend live in a tent and he has similar symptoms. Patient says she has noticed areas of pus throughout her arms and has been picking at her face and says that seems infected. Patient denies nausea, vomiting, cough, or dysuria. Patient uses heroin daily and injects cocaine once weekly, last used heroin yesterday. Patient claims she learned she had Hep C from needle exchange clinic 6 mos ago and has never received treatment.    Hospital Course:  Admitted to hospital medicine started on vanc and rocephin. 3/26 2/2 bottles growing strep. Obtained LIZET, no vegetation found 3/27. Patient transitioned to IV PCN for strep pyogenes 3/28; however grew MRSA from blood cultures from 3/25, repeated cultures 3/29 and started patient on vancomycin. Patient placed on methadone taper per addiction psych completed 3/29. Patient continues to have sharp chest and back pain on palpation and deep breathing. MRI spine ordered to investigate for osteomyelitis, unrevealing but for sugsegmental consolidation in lungs, ordered CT chest, found scattered nodules, with one 2.3 x 1.9 with concern for possible abscess. Will repeat CT near patient's discharge after abx completion.      Interval History:   KYLEIGH. Moved to room with her boyfriend despite contact  precautions- discussed that she may have to be  based on infection control policy. Patient asked if she would be able to finish PO abx as an outpatient. Discussed that the type of infection she has needs to be treated by IV.     Review of Systems   Constitutional: Negative for appetite change, chills and diaphoresis.   Eyes: Negative for visual disturbance.   Respiratory: Negative for shortness of breath.    Cardiovascular: Negative for chest pain.   Gastrointestinal: Negative for constipation.   Genitourinary: Negative for dysuria and hematuria.   Musculoskeletal: Positive for myalgias. Negative for arthralgias and back pain.   Neurological: Negative for numbness and headaches.   Psychiatric/Behavioral: Negative for confusion and hallucinations.     Objective:     Vital Signs (Most Recent):  Temp: 98.1 °F (36.7 °C) (04/01/18 0926)  Pulse: 78 (04/01/18 0926)  Resp: 16 (04/01/18 0926)  BP: 130/82 (04/01/18 0926)  SpO2: 98 % (04/01/18 0926) Vital Signs (24h Range):  Temp:  [97 °F (36.1 °C)-98.8 °F (37.1 °C)] 98.1 °F (36.7 °C)  Pulse:  [70-85] 78  Resp:  [16-18] 16  SpO2:  [94 %-98 %] 98 %  BP: (106-130)/(55-82) 130/82     Weight: 68 kg (150 lb)  Body mass index is 27.44 kg/m².  No intake or output data in the 24 hours ending 04/01/18 1100   Physical Exam   Constitutional: She is oriented to person, place, and time. No distress.   HENT:   Mouth/Throat: Oropharynx is clear and moist.   Eyes: Conjunctivae and EOM are normal. No scleral icterus.   Neck: Normal range of motion. No JVD present.   Cardiovascular: Normal rate, regular rhythm and normal heart sounds.  Exam reveals no gallop and no friction rub.    No murmur heard.  Pulmonary/Chest: Effort normal and breath sounds normal. She has no wheezes. She has no rales. She exhibits tenderness.   Abdominal: Soft. Bowel sounds are normal. She exhibits no distension. There is no tenderness.   Musculoskeletal: She exhibits no edema.   Neurological: She is alert and  oriented to person, place, and time. No sensory deficit.   Skin: Skin is warm. She is not diaphoretic.   Multiple pustule lesions and small areas of scabbing on face, extremities and back. Face with scabbing prominent on chin (improved and almost resolved)   Psychiatric: She has a normal mood and affect.       Significant Labs: All pertinent labs within the past 24 hours have been reviewed.    Significant Imaging: I have reviewed all pertinent imaging results/findings within the past 24 hours.    Assessment/Plan:      * Gram-positive bacteremia    Patient with tachycardia and fever up to 103.5 in setting of IVDU also has multiple pustules on exam and facial lesion consistent with impetigo  Procal 17.89, leukocytosis 13.71  Secondary to Group A strep and MRSA bacteremia  Currently CXR not consistent with PNA, influenza negative  Given 30cc/kg zosyn and vanc in the ED  Bactroban for impetigo to finish 4/2  DC vanc 3/27 and continued rocephin; transitioned to IV PCN 3/28. Vanc restarted given new growth of MRSA  TTE ordered, no vegetations, ordered LIZET, no vegetations  HIV testing ordered, negative  Blood Cx x 2 drawn in the Ed, 2/2 bottles growing strep pyogenes, repeated cultures 3/26 negative, gave IV PCN for one day, MRSA in cultures from 3/25 resulted 3/29, started IV vancomycin, end date April 9, 2018          Active intravenous drug use    Patient everyday heroin/fentanyl  Prn clonidine, loperamide, ibuprofen, and tizanidine for withdrawal symptoms  Consult to addiction psych who has started methadone with taper completed taper 3/29, plan for rehab patient amendable          Chest pain    Reproducible on exam  EKG without STEMI or depressions  No other cardiac risk factors other than IV cocaine use intermittently; however denies use the week of admission   MRI spine ordered 2/2 to recent MRSA isolated in blood from 3/25, negative for osteomyelitis, showed subsegmental opacities   CT chest ordered to further  investigate 3/30 found scattered nodules, with one 2.3 x 1.9 with concern for possible abscess. Will repeat CT near patient's discharge after abx completion.  Likely MSK, no evidence of skeletal disease on imagine, prn toradol ordered for 3 days, improved today 3/31            Hepatitis C    Reported per patient who does not have medical care, patient discovered 6 mos ago from needle exchange service; not currently being treated  Hep C RNA + and viral rna detected  Hep B surface antibody positive, ordered Hep e testing and Hep B core total, negative            VTE Risk Mitigation         Ordered     IP VTE LOW RISK PATIENT  Once      03/25/18 1900              Erin Bosch MD  Department of Hospital Medicine   [facility]

## 2018-04-02 PROBLEM — F11.20 OPIOID USE DISORDER, SEVERE, DEPENDENCE: Status: ACTIVE | Noted: 2018-04-02

## 2018-04-02 LAB
BACTERIA BLD CULT: NORMAL
BACTERIA BLD CULT: NORMAL
VANCOMYCIN TROUGH SERPL-MCNC: 25.9 UG/ML

## 2018-04-02 PROCEDURE — 63600175 PHARM REV CODE 636 W HCPCS: Performed by: STUDENT IN AN ORGANIZED HEALTH CARE EDUCATION/TRAINING PROGRAM

## 2018-04-02 PROCEDURE — 12000002 HC ACUTE/MED SURGE SEMI-PRIVATE ROOM

## 2018-04-02 PROCEDURE — 25000003 PHARM REV CODE 250: Performed by: STUDENT IN AN ORGANIZED HEALTH CARE EDUCATION/TRAINING PROGRAM

## 2018-04-02 PROCEDURE — 80202 ASSAY OF VANCOMYCIN: CPT

## 2018-04-02 PROCEDURE — 63600175 PHARM REV CODE 636 W HCPCS: Performed by: INTERNAL MEDICINE

## 2018-04-02 PROCEDURE — 99232 SBSQ HOSP IP/OBS MODERATE 35: CPT | Mod: ,,, | Performed by: HOSPITALIST

## 2018-04-02 PROCEDURE — 25000003 PHARM REV CODE 250: Performed by: INTERNAL MEDICINE

## 2018-04-02 PROCEDURE — 99232 SBSQ HOSP IP/OBS MODERATE 35: CPT | Mod: ,,, | Performed by: PSYCHIATRY & NEUROLOGY

## 2018-04-02 PROCEDURE — 36415 COLL VENOUS BLD VENIPUNCTURE: CPT

## 2018-04-02 RX ORDER — KETOROLAC TROMETHAMINE 15 MG/ML
30 INJECTION, SOLUTION INTRAMUSCULAR; INTRAVENOUS EVERY 6 HOURS PRN
Status: DISPENSED | OUTPATIENT
Start: 2018-04-02 | End: 2018-04-05

## 2018-04-02 RX ADMIN — LOPERAMIDE HYDROCHLORIDE 2 MG: 2 CAPSULE ORAL at 08:04

## 2018-04-02 RX ADMIN — TIZANIDINE 4 MG: 2 TABLET ORAL at 06:04

## 2018-04-02 RX ADMIN — KETOROLAC TROMETHAMINE 30 MG: 15 INJECTION, SOLUTION INTRAMUSCULAR; INTRAVENOUS at 06:04

## 2018-04-02 RX ADMIN — MUPIROCIN: 20 OINTMENT TOPICAL at 09:04

## 2018-04-02 RX ADMIN — VANCOMYCIN HYDROCHLORIDE 1750 MG: 10 INJECTION, POWDER, LYOPHILIZED, FOR SOLUTION INTRAVENOUS at 10:04

## 2018-04-02 RX ADMIN — VANCOMYCIN HYDROCHLORIDE 1750 MG: 10 INJECTION, POWDER, LYOPHILIZED, FOR SOLUTION INTRAVENOUS at 01:04

## 2018-04-02 NOTE — SUBJECTIVE & OBJECTIVE
"Interval History: See hospital course.    Family History     None        Social History Main Topics    Smoking status: Current Some Day Smoker     Packs/day: 0.50     Types: Cigarettes    Smokeless tobacco: Never Used    Alcohol use Not on file    Drug use: Yes     Frequency: 3.0 times per week     Types: IV      Comment: Heroin    Sexual activity: Not on file     Psychotherapeutics     None           Review of Systems  Objective:     Vital Signs (Most Recent):  Temp: 97.8 °F (36.6 °C) (04/02/18 0136)  Pulse: 70 (04/02/18 0136)  Resp: 16 (04/02/18 0136)  BP: 138/77 (04/02/18 0136)  SpO2: 96 % (04/01/18 2009) Vital Signs (24h Range):  Temp:  [96.6 °F (35.9 °C)-98.5 °F (36.9 °C)] 97.8 °F (36.6 °C)  Pulse:  [70-84] 70  Resp:  [16-18] 16  SpO2:  [95 %-98 %] 96 %  BP: (125-142)/(69-93) 138/77     Height: 5' 2" (157.5 cm)  Weight: 68 kg (150 lb)  Body mass index is 27.44 kg/m².    No intake or output data in the 24 hours ending 04/02/18 0850    Physical Exam      Mental Status Exam:  Appearance: unremarkable, age appropriate  Behavior/Cooperation: limited/ appropriate normal, cooperative  Speech: appropriate rate, volume and tone normal tone, normal rate, normal pitch, normal volume  Language: uses words appropriately; NO aphasia or dysarthria  Mood: euthymic  Affect:  congruent with mood and appropriate to situation/content   Thought Process: normal and logical  Thought Content: normal, no suicidality, no homicidality, delusions, or paranoia  Level of Consciousness: Alert and Oriented x3  Memory:  Intact   3/3 immediate, 3/3 at 5 minutes    Recent:  Intact; able to report recent events   Remote:  Intact; Named 4/4 past presidents   Attention/concentration: appropriate for age/education.   Fund of Knowledge: appears adequate  Insight:   Intact  Judgment:  Intact    Significant Labs:   Last 24 Hours:   Recent Lab Results     None          Significant Imaging: I have reviewed all pertinent imaging results/findings " within the past 24 hours.

## 2018-04-02 NOTE — PROGRESS NOTES
[facility] Hospital Medicine  Progress Note    Patient Name: Kimberlee Ling  MRN: [id number]  Patient Class: IP- Inpatient   Admission Date: 3/25/2018  Length of Stay: 8 days  Attending Physician: Corinne Perry MD  Primary Care Provider: Primary Doctor [facility] [place] [facility] [place] [facility] [place] Maribell Segunod MD    Subjective:     Principal Problem:Gram-positive bacteremia    HPI:  33yoF with PMHx of reported Hep C and IVDU presents with 2 day hx of fevers with associated SOB, sharp reproducible chest pain radiating to back, myalgias, and one loose bowel movement. Patient claims she has had flu like symptoms before but this different. Patient claims her and her boyfriend live in a tent and he has similar symptoms. Patient says she has noticed areas of pus throughout her arms and has been picking at her face and says that seems infected. Patient denies nausea, vomiting, cough, or dysuria. Patient uses heroin daily and injects cocaine once weekly, last used heroin yesterday. Patient claims she learned she had Hep C from needle exchange clinic 6 mos ago and has never received treatment.    Hospital Course:  Admitted to hospital medicine started on vanc and rocephin. 3/26 2/2 bottles growing strep. Obtained LIZET, no vegetation found 3/27. Patient transitioned to IV PCN for strep pyogenes 3/28; however grew MRSA from blood cultures from 3/25, repeated cultures 3/29 and started patient on vancomycin. Patient placed on methadone taper per addiction psych completed 3/29. Patient continues to have sharp chest and back pain on palpation and deep breathing. MRI spine ordered to investigate for osteomyelitis, unrevealing but for sugsegmental consolidation in lungs, ordered CT chest, found scattered nodules, with one 2.3 x 1.9 with concern for possible abscess. Will repeat CT near patient's discharge after abx completion. Patient difficulty with maintaining PIV, PICC consult for midline insertion  4/2.      Interval History:   Patient with no complaints overnight. PIV access an issue. Ordered PICC consult for midline.  Review of Systems   Constitutional: Negative for appetite change, chills and diaphoresis.   Eyes: Negative for visual disturbance.   Respiratory: Negative for shortness of breath.    Cardiovascular: Negative for chest pain.   Gastrointestinal: Negative for constipation.   Genitourinary: Negative for dysuria and hematuria.   Musculoskeletal: Negative for arthralgias and back pain.   Neurological: Negative for numbness and headaches.   Psychiatric/Behavioral: Negative for confusion and hallucinations.     Objective:     Vital Signs (Most Recent):  Temp: 98.8 °F (37.1 °C) (04/02/18 1236)  Pulse: 86 (04/02/18 1236)  Resp: 18 (04/02/18 1236)  BP: 131/89 (04/02/18 1236)  SpO2: 98 % (04/02/18 1236) Vital Signs (24h Range):  Temp:  [97.8 °F (36.6 °C)-98.8 °F (37.1 °C)] 98.8 °F (37.1 °C)  Pulse:  [70-86] 86  Resp:  [16-18] 18  SpO2:  [96 %-98 %] 98 %  BP: (131-142)/(77-93) 131/89     Weight: 68 kg (150 lb)  Body mass index is 27.44 kg/m².  No intake or output data in the 24 hours ending 04/02/18 1311   Physical Exam   Constitutional: She is oriented to person, place, and time. No distress.   HENT:   Mouth/Throat: Oropharynx is clear and moist.   Eyes: Conjunctivae and EOM are normal. No scleral icterus.   Neck: Normal range of motion. No JVD present.   Cardiovascular: Normal rate, regular rhythm and normal heart sounds.  Exam reveals no gallop and no friction rub.    No murmur heard.  Pulmonary/Chest: Effort normal and breath sounds normal. She has no wheezes. She has no rales. She exhibits tenderness.   Abdominal: Soft. Bowel sounds are normal. She exhibits no distension. There is no tenderness.   Musculoskeletal: She exhibits no edema.   Neurological: She is alert and oriented to person, place, and time. No sensory deficit.   Skin: Skin is warm. She is not diaphoretic.   Multiple pustule lesions and  small areas of scabbing on face, extremities and back. Face with scabbing prominent on chin (improved)   Psychiatric: She has a normal mood and affect.       Significant Labs: All pertinent labs within the past 24 hours have been reviewed.    Significant Imaging: I have reviewed all pertinent imaging results/findings within the past 24 hours.    Assessment/Plan:      * Gram-positive bacteremia    Patient with tachycardia and fever up to 103.5 in setting of IVDU also has multiple pustules on exam and facial lesion consistent with impetigo  Procal 17.89, leukocytosis 13.71 on admission  Secondary to Group A strep and MRSA bacteremia  Currently CXR not consistent with PNA, influenza negative  Given 30cc/kg zosyn and vanc in the ED  Bactroban for impetigo to finish 4/2  DC vanc 3/27 and continued rocephin; transitioned to IV PCN 3/28. Vanc restarted given new growth of MRSA  TTE ordered, no vegetations, ordered LIZET, no vegetations  HIV testing ordered, negative  Blood Cx x 2 drawn in the Ed, 2/2 bottles growing strep pyogenes, repeated cultures 3/26 negative, gave IV PCN for one day, MRSA in cultures from 3/25 resulted 3/29, started IV vancomycin, end date April 9, 2018  PICC consult for PICC. Patient previously refused 3/31, says she is amendable, re-consulted 4/2 for midline          Active intravenous drug use    Patient everyday heroin/fentanyl  Prn clonidine, loperamide, ibuprofen, and tizanidine for withdrawal symptoms  Consult to addiction psych who has started methadone with taper completed taper 3/29, plan for rehab patient amendable          Chest pain    Reproducible on exam  EKG without STEMI or depressions  No other cardiac risk factors other than IV cocaine use intermittently; however denies use the week of admission   MRI spine ordered 2/2 to recent MRSA isolated in blood from 3/25, negative for osteomyelitis, showed subsegmental opacities   CT chest ordered to further investigate 3/30 found scattered  nodules, with one 2.3 x 1.9 with concern for possible abscess. Will repeat CT near patient's discharge after abx completion.  Likely MSK, no evidence of skeletal disease on imagine, prn toradol ordered for 3 days, improved today 3/31            Hepatitis C    Reported per patient who does not have medical care, patient discovered 6 mos ago from needle exchange service; not currently being treated  Hep C RNA + and viral rna detected  Hep B surface antibody positive, ordered Hep e testing and Hep B core total, negative            VTE Risk Mitigation         Ordered     IP VTE LOW RISK PATIENT  Once      03/25/18 1900              Maribell Segundo MD  Department of Hospital Medicine   [facility]

## 2018-04-02 NOTE — NURSING
AAO x4, VSS, pain well controlled, ambulating independently in room. Follow up blood cultures have been negative to date - MD said OK to remove contact precautions. Infection control called and recommended for pt to stay in isolation d/t her high risk. Pt is now ambulating in hallway. Continue IV abx.

## 2018-04-02 NOTE — SUBJECTIVE & OBJECTIVE
Interval History:   Patient with no complaints overnight. PIV access an issue. Ordered PICC consult for midline.  Review of Systems   Constitutional: Negative for appetite change, chills and diaphoresis.   Eyes: Negative for visual disturbance.   Respiratory: Negative for shortness of breath.    Cardiovascular: Negative for chest pain.   Gastrointestinal: Negative for constipation.   Genitourinary: Negative for dysuria and hematuria.   Musculoskeletal: Negative for arthralgias and back pain.   Neurological: Negative for numbness and headaches.   Psychiatric/Behavioral: Negative for confusion and hallucinations.     Objective:     Vital Signs (Most Recent):  Temp: 98.8 °F (37.1 °C) (04/02/18 1236)  Pulse: 86 (04/02/18 1236)  Resp: 18 (04/02/18 1236)  BP: 131/89 (04/02/18 1236)  SpO2: 98 % (04/02/18 1236) Vital Signs (24h Range):  Temp:  [97.8 °F (36.6 °C)-98.8 °F (37.1 °C)] 98.8 °F (37.1 °C)  Pulse:  [70-86] 86  Resp:  [16-18] 18  SpO2:  [96 %-98 %] 98 %  BP: (131-142)/(77-93) 131/89     Weight: 68 kg (150 lb)  Body mass index is 27.44 kg/m².  No intake or output data in the 24 hours ending 04/02/18 1311   Physical Exam   Constitutional: She is oriented to person, place, and time. No distress.   HENT:   Mouth/Throat: Oropharynx is clear and moist.   Eyes: Conjunctivae and EOM are normal. No scleral icterus.   Neck: Normal range of motion. No JVD present.   Cardiovascular: Normal rate, regular rhythm and normal heart sounds.  Exam reveals no gallop and no friction rub.    No murmur heard.  Pulmonary/Chest: Effort normal and breath sounds normal. She has no wheezes. She has no rales. She exhibits tenderness.   Abdominal: Soft. Bowel sounds are normal. She exhibits no distension. There is no tenderness.   Musculoskeletal: She exhibits no edema.   Neurological: She is alert and oriented to person, place, and time. No sensory deficit.   Skin: Skin is warm. She is not diaphoretic.   Multiple pustule lesions and small areas  of scabbing on face, extremities and back. Face with scabbing prominent on chin (improved)   Psychiatric: She has a normal mood and affect.       Significant Labs: All pertinent labs within the past 24 hours have been reviewed.    Significant Imaging: I have reviewed all pertinent imaging results/findings within the past 24 hours.

## 2018-04-02 NOTE — ASSESSMENT & PLAN NOTE
Patient with tachycardia and fever up to 103.5 in setting of IVDU also has multiple pustules on exam and facial lesion consistent with impetigo  Procal 17.89, leukocytosis 13.71 on admission  Secondary to Group A strep and MRSA bacteremia  Currently CXR not consistent with PNA, influenza negative  Given 30cc/kg zosyn and vanc in the ED  Bactroban for impetigo to finish 4/2  DC vanc 3/27 and continued rocephin; transitioned to IV PCN 3/28. Vanc restarted given new growth of MRSA  TTE ordered, no vegetations, ordered LIZET, no vegetations  HIV testing ordered, negative  Blood Cx x 2 drawn in the Ed, 2/2 bottles growing strep pyogenes, repeated cultures 3/26 negative, gave IV PCN for one day, MRSA in cultures from 3/25 resulted 3/29, started IV vancomycin, end date April 9, 2018  PICC consult for PICC. Patient previously refused 3/31, says she is amendable, re-consulted 4/2 for midline

## 2018-04-03 PROBLEM — F11.20 OPIOID USE DISORDER, SEVERE, DEPENDENCE: Status: ACTIVE | Noted: 2018-04-03

## 2018-04-03 LAB
ANION GAP SERPL CALC-SCNC: 10 MMOL/L
BACTERIA BLD CULT: NORMAL
BACTERIA BLD CULT: NORMAL
BUN SERPL-MCNC: 18 MG/DL
CALCIUM SERPL-MCNC: 9.1 MG/DL
CHLORIDE SERPL-SCNC: 104 MMOL/L
CO2 SERPL-SCNC: 23 MMOL/L
CREAT SERPL-MCNC: 0.8 MG/DL
EST. GFR  (AFRICAN AMERICAN): >60 ML/MIN/1.73 M^2
EST. GFR  (NON AFRICAN AMERICAN): >60 ML/MIN/1.73 M^2
GLUCOSE SERPL-MCNC: 98 MG/DL
POTASSIUM SERPL-SCNC: 4.4 MMOL/L
SODIUM SERPL-SCNC: 137 MMOL/L

## 2018-04-03 PROCEDURE — 36415 COLL VENOUS BLD VENIPUNCTURE: CPT

## 2018-04-03 PROCEDURE — 63600175 PHARM REV CODE 636 W HCPCS: Performed by: STUDENT IN AN ORGANIZED HEALTH CARE EDUCATION/TRAINING PROGRAM

## 2018-04-03 PROCEDURE — 36569 INSJ PICC 5 YR+ W/O IMAGING: CPT

## 2018-04-03 PROCEDURE — 25500020 PHARM REV CODE 255: Performed by: HOSPITALIST

## 2018-04-03 PROCEDURE — 76937 US GUIDE VASCULAR ACCESS: CPT

## 2018-04-03 PROCEDURE — 25000003 PHARM REV CODE 250: Performed by: STUDENT IN AN ORGANIZED HEALTH CARE EDUCATION/TRAINING PROGRAM

## 2018-04-03 PROCEDURE — 99233 SBSQ HOSP IP/OBS HIGH 50: CPT | Mod: ,,, | Performed by: HOSPITALIST

## 2018-04-03 PROCEDURE — C1751 CATH, INF, PER/CENT/MIDLINE: HCPCS

## 2018-04-03 PROCEDURE — 12000002 HC ACUTE/MED SURGE SEMI-PRIVATE ROOM

## 2018-04-03 PROCEDURE — 80048 BASIC METABOLIC PNL TOTAL CA: CPT

## 2018-04-03 RX ORDER — SODIUM CHLORIDE 9 MG/ML
INJECTION, SOLUTION INTRAVENOUS CONTINUOUS
Status: ACTIVE | OUTPATIENT
Start: 2018-04-03 | End: 2018-04-03

## 2018-04-03 RX ADMIN — MUPIROCIN: 20 OINTMENT TOPICAL at 08:04

## 2018-04-03 RX ADMIN — KETOROLAC TROMETHAMINE 30 MG: 15 INJECTION, SOLUTION INTRAMUSCULAR; INTRAVENOUS at 09:04

## 2018-04-03 RX ADMIN — TIZANIDINE 4 MG: 2 TABLET ORAL at 09:04

## 2018-04-03 RX ADMIN — Medication 1250 MG: at 09:04

## 2018-04-03 RX ADMIN — IOHEXOL 75 ML: 350 INJECTION, SOLUTION INTRAVENOUS at 02:04

## 2018-04-03 RX ADMIN — SODIUM CHLORIDE: 0.9 INJECTION, SOLUTION INTRAVENOUS at 12:04

## 2018-04-03 RX ADMIN — Medication 1250 MG: at 04:04

## 2018-04-03 RX ADMIN — Medication 1250 MG: at 12:04

## 2018-04-03 NOTE — PLAN OF CARE
CM provided substance abuse/recovery list to pt per request; also updated on LISA of 4/10 as IV abx are expected to be completed 4/9.     04/03/18 1241   Discharge Reassessment   Assessment Type Discharge Planning Reassessment   Provided patient/caregiver education on the expected discharge date and the discharge plan Yes   Do you have any problems affording any of your prescribed medications? Yes   Discharge Plan A Shelter   Discharge Plan B Other  (homeless)   Patient choice form signed by patient/caregiver N/A   Can the patient answer the patient profile reliably? Yes, cognitively intact   How does the patient rate their overall health at the present time? Fair   Describe the patient's ability to walk at the present time. Minor restrictions or changes   How often would a person be available to care for the patient? Occasionally   Number of comorbid conditions (as recorded on the chart) Two   During the past month, has the patient often been bothered by feeling down, depressed or hopeless? Yes   During the past month, has the patient often been bothered by little interest or pleasure in doing things? Yes

## 2018-04-03 NOTE — PROGRESS NOTES
[facility]  Psychiatry  Progress Note    Patient Name: Kimberlee Ling  MRN: [id number]   Code Status: Full Code  Admission Date: 3/25/2018  Hospital Length of Stay: 8 days  Expected Discharge Date: 4/10/2018  Attending Physician: Corinne Perry MD  Primary Care Provider: Primary Doctor No    Current Legal Status: N/A    Patient information was obtained from patient and ER records.     Subjective:     Principal Problem:Gram-positive bacteremia    Chief Complaint: IVDU    HPI: Kimberlee Ling is a 33 y.o. female with a history of Hep C and IV Drug Use who presented to [facility] due to 2 day history of fevers. Psychiatry was consulted to address the patient's symptoms of Opiate withdrawal and to consider Opiate Replacement therapy.     Patient reports that she was admitted to the hospital due to 2 days of fevers. She has been using IV Heroin for the past 6 years. She was initially buying Pain pills on the street prior to using IV drugs. She reports that she has been using 1-2 grams daily since then. She denies any history of going to rehab but she has been to detox at [facility] in the past. She reports one accidental overdose in the past. Patient also reports using prescription benzodiazepines and drinking alcohol heavily in the past however she stopped using these substances when she started using opiates. She has never used any opiate replacement therapies in the past however she is interested in anything that will help her with withdrawal symptoms.     Patient denies any psychiatric history. Patient has never tried to harm herself or been on any psychiatric medications in the past.      At this time the patient reports pain, nausea, and chills which are similar to her previous episodes of withdrawal. She reports that she is amenable to residential rehab after discharge however she would like to go to a program that her boyfriend who also uses with her can go to as well. She denies  "SI/HI/AVH.    Hospital Course: 04/02/2018 - The patient is seen in the presence of her  who also suffers from opioid use disorder, severe. She notes that she is motivated to obtain residential substance abuse treatment when leaving the hospital. We discuss the potential of her going to [facility]. She notes that she is highly interested in this option. She notes that she does not want to go to [facility] as she has done that program in the past and used substances while in treatment. She is given a resource sheet and encouraged top call and discuss bed availability.       Interval History: See hospital course.    Family History     None        Social History Main Topics    Smoking status: Current Some Day Smoker     Packs/day: 0.50     Types: Cigarettes    Smokeless tobacco: Never Used    Alcohol use Not on file    Drug use: Yes     Frequency: 3.0 times per week     Types: IV      Comment: Heroin    Sexual activity: Not on file     Psychotherapeutics     None           Review of Systems  Objective:     Vital Signs (Most Recent):  Temp: 97.8 °F (36.6 °C) (04/02/18 0136)  Pulse: 70 (04/02/18 0136)  Resp: 16 (04/02/18 0136)  BP: 138/77 (04/02/18 0136)  SpO2: 96 % (04/01/18 2009) Vital Signs (24h Range):  Temp:  [96.6 °F (35.9 °C)-98.5 °F (36.9 °C)] 97.8 °F (36.6 °C)  Pulse:  [70-84] 70  Resp:  [16-18] 16  SpO2:  [95 %-98 %] 96 %  BP: (125-142)/(69-93) 138/77     Height: 5' 2" (157.5 cm)  Weight: 68 kg (150 lb)  Body mass index is 27.44 kg/m².    No intake or output data in the 24 hours ending 04/02/18 0850    Physical Exam      Mental Status Exam:  Appearance: unremarkable, age appropriate  Behavior/Cooperation: limited/ appropriate normal, cooperative  Speech: appropriate rate, volume and tone normal tone, normal rate, normal pitch, normal volume  Language: uses words appropriately; NO aphasia or dysarthria  Mood: euthymic  Affect:  congruent with mood and appropriate to situation/content   Thought Process: " normal and logical  Thought Content: normal, no suicidality, no homicidality, delusions, or paranoia  Level of Consciousness: Alert and Oriented x3  Memory:  Intact   3/3 immediate, 3/3 at 5 minutes    Recent:  Intact; able to report recent events   Remote:  Intact; Named 4/4 past presidents   Attention/concentration: appropriate for age/education.   Fund of Knowledge: appears adequate  Insight:   Intact  Judgment:  Intact    Significant Labs:   Last 24 Hours:   Recent Lab Results     None          Significant Imaging: I have reviewed all pertinent imaging results/findings within the past 24 hours.    Assessment/Plan:     Opioid use disorder, severe, dependence    -Patient is highly interested in [facility] for residential substance abuse treatment. Pt is encouraged to call the program and check bed availability.  -Motivational interviewing is utilized to discuss recovery and entering into a program of recovery such as AA/NA  -Abstain from the use of any drugs of abuse               Need for Continued Hospitalization:   No need for inpatient psychiatric hospitalization. Continue medical care as per the primary team.    Anticipated Disposition: Rehab Facility     Total time:  25 with greater than 50% of this time spent in counseling and/or coordination of care.       Zafar Ambrocio MD   Psychiatry Resident  [facility]

## 2018-04-03 NOTE — ASSESSMENT & PLAN NOTE
-Patient is highly interested in [facility] for residential substance abuse treatment. Pt is encouraged to call the program and check bed availability.  -Motivational interviewing is utilized to discuss recovery and entering into a program of recovery such as AA/NA  -Abstain from the use of any drugs of abuse

## 2018-04-03 NOTE — CONSULTS
Single lumen 18G  X 8CM midline placed right basilic vein. Max dwell date 5/2/18, Lot#[id number] .  Needle advanced into the vessel under real time ultrasound guidance.  Image recorded and saved.

## 2018-04-03 NOTE — HOSPITAL COURSE
04/02/2018 - The patient is seen in the presence of her  who also suffers from opioid use disorder, severe. She notes that she is motivated to obtain residential substance abuse treatment when leaving the hospital. We discuss the potential of her going to [facility]. She notes that she is highly interested in this option. She notes that she does not want to go to [facility] as she has done that program in the past and used substances while in treatment. She is given a resource sheet and encouraged top call and discuss bed availability.

## 2018-04-03 NOTE — ASSESSMENT & PLAN NOTE
Reproducible on exam  EKG without STEMI or depressions  No other cardiac risk factors other than IV cocaine use intermittently; however denies use the week of admission   MRI spine ordered 2/2 to recent MRSA isolated in blood from 3/25, negative for osteomyelitis, showed subsegmental opacities   CT chest ordered to further investigate 3/30 found scattered nodules, with one 2.3 x 1.9 with concern for possible abscess. Will repeat CT today to re-asses.   Likely MSK, no evidence of skeletal disease on imagine, prn toradol ordered for 3 days, improved today 3/31

## 2018-04-03 NOTE — HPI
Kimberlee Ling is a 33 y.o. female with a history of Hep C and IV Drug Use who presented to [facility] due to 2 day history of fevers. Psychiatry was consulted to address the patient's symptoms of Opiate withdrawal and to consider Opiate Replacement therapy.     Patient reports that she was admitted to the hospital due to 2 days of fevers. She has been using IV Heroin for the past 6 years. She was initially buying Pain pills on the street prior to using IV drugs. She reports that she has been using 1-2 grams daily since then. She denies any history of going to rehab but she has been to detox at [facility] in the past. She reports one accidental overdose in the past. Patient also reports using prescription benzodiazepines and drinking alcohol heavily in the past however she stopped using these substances when she started using opiates. She has never used any opiate replacement therapies in the past however she is interested in anything that will help her with withdrawal symptoms.     Patient denies any psychiatric history. Patient has never tried to harm herself or been on any psychiatric medications in the past.      At this time the patient reports pain, nausea, and chills which are similar to her previous episodes of withdrawal. She reports that she is amenable to residential rehab after discharge however she would like to go to a program that her boyfriend who also uses with her can go to as well. She denies SI/HI/AVH.

## 2018-04-03 NOTE — PROGRESS NOTES
[facility] Hospital Medicine  Progress Note    Patient Name: Kimberlee Ling  MRN: [id number]  Patient Class: IP- Inpatient   Admission Date: 3/25/2018  Length of Stay: 9 days  Attending Physician: Viktoria Jo MD  Primary Care Provider: Primary Doctor Major Hospital Medicine Team: [facility] MED 4 Austin Lopez MD    Subjective:     Principal Problem:Gram-positive bacteremia    HPI:  33yoF with PMHx of reported Hep C and IVDU presents with 2 day hx of fevers with associated SOB, sharp reproducible chest pain radiating to back, myalgias, and one loose bowel movement. Patient claims she has had flu like symptoms before but this different. Patient claims her and her boyfriend live in a tent and he has similar symptoms. Patient says she has noticed areas of pus throughout her arms and has been picking at her face and says that seems infected. Patient denies nausea, vomiting, cough, or dysuria. Patient uses heroin daily and injects cocaine once weekly, last used heroin yesterday. Patient claims she learned she had Hep C from needle exchange clinic 6 mos ago and has never received treatment.    Hospital Course:  Admitted to hospital medicine started on vanc and rocephin. 3/26 2/2 bottles growing strep. Obtained LIZET, no vegetation found 3/27. Patient transitioned to IV PCN for strep pyogenes 3/28; however grew MRSA from blood cultures from 3/25, repeated cultures 3/29 and started patient on vancomycin. Patient placed on methadone taper per addiction psych completed 3/29. Patient continues to have sharp chest and back pain on palpation and deep breathing. MRI spine ordered to investigate for osteomyelitis, unrevealing but for sugsegmental consolidation in lungs, ordered CT chest, found scattered nodules, with one 2.3 x 1.9 with concern for possible abscess. Will repeat CT near patient's discharge after abx completion. Patient difficulty with maintaining PIV, PICC consult for midline insertion  4/2.      Interval History: No problems over night. She refused midline placement initially, but it was placed later this morning.     Review of Systems   Constitutional: Negative for appetite change, chills and diaphoresis.   Eyes: Negative for visual disturbance.   Respiratory: Negative for shortness of breath.    Cardiovascular: Negative for chest pain.   Gastrointestinal: Negative for constipation.   Genitourinary: Negative for dysuria and hematuria.   Musculoskeletal: Negative for arthralgias and back pain.   Neurological: Negative for numbness and headaches.   Psychiatric/Behavioral: Negative for confusion and hallucinations.     Objective:     Vital Signs (Most Recent):  Temp: 96.7 °F (35.9 °C) (04/03/18 0756)  Pulse: 84 (04/03/18 0756)  Resp: 16 (04/03/18 0756)  BP: 131/84 (04/03/18 0756)  SpO2: 97 % (04/03/18 0756) Vital Signs (24h Range):  Temp:  [96.7 °F (35.9 °C)-99.1 °F (37.3 °C)] 96.7 °F (35.9 °C)  Pulse:  [] 84  Resp:  [16-18] 16  SpO2:  [96 %-99 %] 97 %  BP: (123-156)/(67-96) 131/84     Weight: 68 kg (150 lb)  Body mass index is 27.44 kg/m².    Intake/Output Summary (Last 24 hours) at 04/03/18 1209  Last data filed at 04/03/18 0449   Gross per 24 hour   Intake              970 ml   Output                0 ml   Net              970 ml      Physical Exam   Constitutional: She is oriented to person, place, and time. No distress.   HENT:   Mouth/Throat: Oropharynx is clear and moist.   Eyes: Conjunctivae and EOM are normal. No scleral icterus.   Neck: Normal range of motion. No JVD present.   Cardiovascular: Normal rate, regular rhythm and normal heart sounds.  Exam reveals no gallop and no friction rub.    No murmur heard.  Pulmonary/Chest: Effort normal and breath sounds normal. She has no wheezes. She has no rales. She exhibits tenderness.   Abdominal: Soft. Bowel sounds are normal. She exhibits no distension. There is no tenderness.   Musculoskeletal: She exhibits no edema.   Neurological: She  is alert and oriented to person, place, and time. No sensory deficit.   Skin: Skin is warm. She is not diaphoretic.   Multiple pustule lesions and small areas of scabbing on face, extremities and back. Face with scabbing prominent on chin (improved)   Psychiatric: She has a normal mood and affect.       Significant Labs:   BMP:   Recent Labs  Lab 04/03/18  1013   GLU 98      K 4.4      CO2 23   BUN 18   CREATININE 0.8   CALCIUM 9.1     CBC: No results for input(s): WBC, HGB, HCT, PLT in the last 48 hours.    Significant Imaging: I have reviewed and interpreted all pertinent imaging results/findings within the past 24 hours.    Assessment/Plan:      * Gram-positive bacteremia    Patient with tachycardia and fever up to 103.5 in setting of IVDU also has multiple pustules on exam and facial lesion consistent with impetigo  Procal 17.89, leukocytosis 13.71 on admission  Secondary to Group A strep and MRSA bacteremia  Currently CXR not consistent with PNA, influenza negative  Given 30cc/kg zosyn and vanc in the ED  Bactroban for impetigo to finish 4/2  DC vanc 3/27 and continued rocephin; transitioned to IV PCN 3/28. Vanc restarted given new growth of MRSA  TTE ordered, no vegetations, ordered LIZET, no vegetations  HIV testing ordered, negative  Blood Cx x 2 drawn in the Ed, 2/2 bottles growing strep pyogenes, repeated cultures 3/26 negative, gave IV PCN for one day, MRSA in cultures from 3/25 resulted 3/29, started IV vancomycin, end date April 9, 2018  PICC consult for PICC. Patient previously refused 3/31, says she is amendable, re-consulted 4/2 for midline          Opioid use disorder, severe, dependence    Plans for rehab with [facility]  Discussed with psych.          Active intravenous drug use    Patient everyday heroin/fentanyl  Prn clonidine, loperamide, ibuprofen, and tizanidine for withdrawal symptoms  Consult to addiction psych who has started methadone with taper completed taper 3/29, plan for  rehab patient amendable          Chest pain    Reproducible on exam  EKG without STEMI or depressions  No other cardiac risk factors other than IV cocaine use intermittently; however denies use the week of admission   MRI spine ordered 2/2 to recent MRSA isolated in blood from 3/25, negative for osteomyelitis, showed subsegmental opacities   CT chest ordered to further investigate 3/30 found scattered nodules, with one 2.3 x 1.9 with concern for possible abscess. Will repeat CT today to re-asses.   Likely MSK, no evidence of skeletal disease on imagine, prn toradol ordered for 3 days, improved today 3/31            Hepatitis C    Reported per patient who does not have medical care, patient discovered 6 mos ago from needle exchange service; not currently being treated  Hep C RNA + and viral rna detected  Hep B surface antibody positive, ordered Hep e testing and Hep B core total, negative            VTE Risk Mitigation         Ordered     IP VTE LOW RISK PATIENT  Once      03/25/18 1900              Austin Lopez MD  Department of Hospital Medicine   [facility]

## 2018-04-03 NOTE — SUBJECTIVE & OBJECTIVE
Interval History: No problems over night. She refused midline placement initially, but it was placed later this morning.     Review of Systems   Constitutional: Negative for appetite change, chills and diaphoresis.   Eyes: Negative for visual disturbance.   Respiratory: Negative for shortness of breath.    Cardiovascular: Negative for chest pain.   Gastrointestinal: Negative for constipation.   Genitourinary: Negative for dysuria and hematuria.   Musculoskeletal: Negative for arthralgias and back pain.   Neurological: Negative for numbness and headaches.   Psychiatric/Behavioral: Negative for confusion and hallucinations.     Objective:     Vital Signs (Most Recent):  Temp: 96.7 °F (35.9 °C) (04/03/18 0756)  Pulse: 84 (04/03/18 0756)  Resp: 16 (04/03/18 0756)  BP: 131/84 (04/03/18 0756)  SpO2: 97 % (04/03/18 0756) Vital Signs (24h Range):  Temp:  [96.7 °F (35.9 °C)-99.1 °F (37.3 °C)] 96.7 °F (35.9 °C)  Pulse:  [] 84  Resp:  [16-18] 16  SpO2:  [96 %-99 %] 97 %  BP: (123-156)/(67-96) 131/84     Weight: 68 kg (150 lb)  Body mass index is 27.44 kg/m².    Intake/Output Summary (Last 24 hours) at 04/03/18 1209  Last data filed at 04/03/18 0449   Gross per 24 hour   Intake              970 ml   Output                0 ml   Net              970 ml      Physical Exam   Constitutional: She is oriented to person, place, and time. No distress.   HENT:   Mouth/Throat: Oropharynx is clear and moist.   Eyes: Conjunctivae and EOM are normal. No scleral icterus.   Neck: Normal range of motion. No JVD present.   Cardiovascular: Normal rate, regular rhythm and normal heart sounds.  Exam reveals no gallop and no friction rub.    No murmur heard.  Pulmonary/Chest: Effort normal and breath sounds normal. She has no wheezes. She has no rales. She exhibits tenderness.   Abdominal: Soft. Bowel sounds are normal. She exhibits no distension. There is no tenderness.   Musculoskeletal: She exhibits no edema.   Neurological: She is alert  and oriented to person, place, and time. No sensory deficit.   Skin: Skin is warm. She is not diaphoretic.   Multiple pustule lesions and small areas of scabbing on face, extremities and back. Face with scabbing prominent on chin (improved)   Psychiatric: She has a normal mood and affect.       Significant Labs:   BMP:   Recent Labs  Lab 04/03/18  1013   GLU 98      K 4.4      CO2 23   BUN 18   CREATININE 0.8   CALCIUM 9.1     CBC: No results for input(s): WBC, HGB, HCT, PLT in the last 48 hours.    Significant Imaging: I have reviewed and interpreted all pertinent imaging results/findings within the past 24 hours.

## 2018-04-04 PROBLEM — D64.9 ANEMIA: Status: ACTIVE | Noted: 2018-04-04

## 2018-04-04 LAB
ALBUMIN SERPL BCP-MCNC: 2.9 G/DL
ALP SERPL-CCNC: 46 U/L
ALT SERPL W/O P-5'-P-CCNC: 10 U/L
ANION GAP SERPL CALC-SCNC: 7 MMOL/L
AST SERPL-CCNC: 9 U/L
BASOPHILS # BLD AUTO: 0.04 K/UL
BASOPHILS NFR BLD: 0.6 %
BILIRUB SERPL-MCNC: 0.2 MG/DL
BUN SERPL-MCNC: 16 MG/DL
CALCIUM SERPL-MCNC: 8.7 MG/DL
CHLORIDE SERPL-SCNC: 109 MMOL/L
CO2 SERPL-SCNC: 25 MMOL/L
CREAT SERPL-MCNC: 0.7 MG/DL
DIFFERENTIAL METHOD: ABNORMAL
EOSINOPHIL # BLD AUTO: 0.1 K/UL
EOSINOPHIL NFR BLD: 2 %
ERYTHROCYTE [DISTWIDTH] IN BLOOD BY AUTOMATED COUNT: 13.8 %
EST. GFR  (AFRICAN AMERICAN): >60 ML/MIN/1.73 M^2
EST. GFR  (NON AFRICAN AMERICAN): >60 ML/MIN/1.73 M^2
GLUCOSE SERPL-MCNC: 98 MG/DL
HCT VFR BLD AUTO: 28.1 %
HGB BLD-MCNC: 8.9 G/DL
IMM GRANULOCYTES # BLD AUTO: 0.05 K/UL
IMM GRANULOCYTES NFR BLD AUTO: 0.8 %
LYMPHOCYTES # BLD AUTO: 2.7 K/UL
LYMPHOCYTES NFR BLD: 41.7 %
MAGNESIUM SERPL-MCNC: 2 MG/DL
MCH RBC QN AUTO: 27.8 PG
MCHC RBC AUTO-ENTMCNC: 31.7 G/DL
MCV RBC AUTO: 88 FL
MONOCYTES # BLD AUTO: 0.5 K/UL
MONOCYTES NFR BLD: 8.4 %
NEUTROPHILS # BLD AUTO: 3 K/UL
NEUTROPHILS NFR BLD: 46.5 %
NRBC BLD-RTO: 0 /100 WBC
PHOSPHATE SERPL-MCNC: 4.4 MG/DL
PLATELET # BLD AUTO: 263 K/UL
PMV BLD AUTO: 8.8 FL
POTASSIUM SERPL-SCNC: 4.2 MMOL/L
PROT SERPL-MCNC: 6.5 G/DL
RBC # BLD AUTO: 3.2 M/UL
SODIUM SERPL-SCNC: 141 MMOL/L
VANCOMYCIN TROUGH SERPL-MCNC: 18.7 UG/ML
WBC # BLD AUTO: 6.43 K/UL

## 2018-04-04 PROCEDURE — 84100 ASSAY OF PHOSPHORUS: CPT

## 2018-04-04 PROCEDURE — 12000002 HC ACUTE/MED SURGE SEMI-PRIVATE ROOM

## 2018-04-04 PROCEDURE — 25000003 PHARM REV CODE 250: Performed by: STUDENT IN AN ORGANIZED HEALTH CARE EDUCATION/TRAINING PROGRAM

## 2018-04-04 PROCEDURE — 85025 COMPLETE CBC W/AUTO DIFF WBC: CPT

## 2018-04-04 PROCEDURE — 63600175 PHARM REV CODE 636 W HCPCS: Performed by: STUDENT IN AN ORGANIZED HEALTH CARE EDUCATION/TRAINING PROGRAM

## 2018-04-04 PROCEDURE — 83735 ASSAY OF MAGNESIUM: CPT

## 2018-04-04 PROCEDURE — 80202 ASSAY OF VANCOMYCIN: CPT

## 2018-04-04 PROCEDURE — 80053 COMPREHEN METABOLIC PANEL: CPT

## 2018-04-04 PROCEDURE — 99231 SBSQ HOSP IP/OBS SF/LOW 25: CPT | Mod: ,,, | Performed by: HOSPITALIST

## 2018-04-04 PROCEDURE — 25000003 PHARM REV CODE 250: Performed by: HOSPITALIST

## 2018-04-04 RX ORDER — POLYETHYLENE GLYCOL 3350 17 G/17G
17 POWDER, FOR SOLUTION ORAL DAILY PRN
Status: DISCONTINUED | OUTPATIENT
Start: 2018-04-04 | End: 2018-04-07 | Stop reason: HOSPADM

## 2018-04-04 RX ADMIN — Medication 1250 MG: at 12:04

## 2018-04-04 RX ADMIN — POLYETHYLENE GLYCOL 3350 17 G: 17 POWDER, FOR SOLUTION ORAL at 12:04

## 2018-04-04 RX ADMIN — Medication 1250 MG: at 08:04

## 2018-04-04 RX ADMIN — Medication 1250 MG: at 05:04

## 2018-04-04 RX ADMIN — MUPIROCIN: 20 OINTMENT TOPICAL at 08:04

## 2018-04-04 NOTE — PROGRESS NOTES
"Spoke w/ pt while she was rooming the [place] on unit. She states no nutritional issues, but Boyfriend would like DBL Portions "like the last time he was here", pt then thank me for checking on her and walked away.      Nutrition Problem  Inadequate oral intake     Related to (etiology):   Social factors. Lack of money & IVDA     Signs and Symptoms (as evidenced by):   Pt homeless for past year, pt is unemployed,      Interventions/Recommendations (treatment strategy):  Cont. with regular diet.   Encourage po intake > 75% and HVP at each meal.   RD to follow     Nutrition Diagnosis Status:   New  "

## 2018-04-04 NOTE — PLAN OF CARE
Problem: Patient Care Overview  Goal: Plan of Care Review  Kal any complaint this shift. VSS. Vanc trough was done result 18.7. Awake almost the entire shift.

## 2018-04-04 NOTE — ASSESSMENT & PLAN NOTE
Reproducible on exam  EKG without STEMI or depressions  No other cardiac risk factors other than IV cocaine use intermittently; however denies use the week of admission   MRI spine ordered 2/2 to recent MRSA isolated in blood from 3/25, negative for osteomyelitis, showed subsegmental opacities   CT chest ordered to further investigate 3/30 found scattered nodules, with one 2.3 x 1.9 with concern for possible abscess. CT repeated, nodules are unchanged in size. Likely MSK, no evidence of skeletal disease on imagine, prn toradol ordered for 3 days, improved today 3/31

## 2018-04-04 NOTE — PROGRESS NOTES
[facility] Hospital Medicine  Progress Note    Patient Name: Kimberlee Ling  MRN: [id number]  Patient Class: IP- Inpatient   Admission Date: 3/25/2018  Length of Stay: 10 days  Attending Physician: Viktoria Jo MD  Primary Care Provider: Primary Doctor [facility] Medicine Team: [facility] MED 4 Austin Lopez MD    Subjective:     Principal Problem:Gram-positive bacteremia    HPI:  33yoF with PMHx of reported Hep C and IVDU presents with 2 day hx of fevers with associated SOB, sharp reproducible chest pain radiating to back, myalgias, and one loose bowel movement. Patient claims she has had flu like symptoms before but this different. Patient claims her and her boyfriend live in a tent and he has similar symptoms. Patient says she has noticed areas of pus throughout her arms and has been picking at her face and says that seems infected. Patient denies nausea, vomiting, cough, or dysuria. Patient uses heroin daily and injects cocaine once weekly, last used heroin yesterday. Patient claims she learned she had Hep C from needle exchange clinic 6 mos ago and has never received treatment.    Hospital Course:  Admitted to hospital medicine started on vanc and rocephin. 3/26 2/2 bottles growing strep. Obtained LIZET, no vegetation found 3/27. Patient transitioned to IV PCN for strep pyogenes 3/28; however grew MRSA from blood cultures from 3/25, repeated cultures 3/29 and started patient on vancomycin. Patient placed on methadone taper per addiction psych completed 3/29. Patient continues to have sharp chest and back pain on palpation and deep breathing. MRI spine ordered to investigate for osteomyelitis, unrevealing but for sugsegmental consolidation in lungs, ordered CT chest, found scattered nodules, with one 2.3 x 1.9 with concern for possible abscess. Will repeat CT near patient's discharge after abx completion. Patient difficulty with maintaining PIV, PICC consult for midline insertion  4/2.      Interval History: No problems over night.    Review of Systems   Constitutional: Negative for appetite change, chills and diaphoresis.   Eyes: Negative for visual disturbance.   Respiratory: Negative for shortness of breath.    Cardiovascular: Negative for chest pain.   Gastrointestinal: Negative for constipation.   Genitourinary: Negative for dysuria and hematuria.   Musculoskeletal: Negative for arthralgias and back pain.   Neurological: Negative for numbness and headaches.   Psychiatric/Behavioral: Negative for confusion and hallucinations.     Objective:     Vital Signs (Most Recent):  Temp: 97.9 °F (36.6 °C) (04/04/18 0738)  Pulse: 98 (04/04/18 0738)  Resp: 14 (04/04/18 0738)  BP: 127/74 (04/04/18 0738)  SpO2: 97 % (04/04/18 0738) Vital Signs (24h Range):  Temp:  [97.9 °F (36.6 °C)-99.1 °F (37.3 °C)] 97.9 °F (36.6 °C)  Pulse:  [] 98  Resp:  [14-18] 14  SpO2:  [96 %-99 %] 97 %  BP: (127-144)/(64-85) 127/74     Weight: 68 kg (150 lb)  Body mass index is 27.44 kg/m².    Intake/Output Summary (Last 24 hours) at 04/04/18 1143  Last data filed at 04/03/18 2151   Gross per 24 hour   Intake              250 ml   Output                0 ml   Net              250 ml      Physical Exam   Constitutional: She is oriented to person, place, and time. No distress.   HENT:   Mouth/Throat: Oropharynx is clear and moist.   Eyes: Conjunctivae and EOM are normal. No scleral icterus.   Neck: Normal range of motion. No JVD present.   Cardiovascular: Normal rate, regular rhythm and normal heart sounds.  Exam reveals no gallop and no friction rub.    No murmur heard.  Pulmonary/Chest: Effort normal and breath sounds normal. She has no wheezes. She has no rales. She exhibits tenderness.   Abdominal: Soft. Bowel sounds are normal. She exhibits no distension. There is no tenderness.   Musculoskeletal: She exhibits no edema.   Neurological: She is alert and oriented to person, place, and time. No sensory deficit.   Skin:  Skin is warm. She is not diaphoretic.   Multiple pustule lesions and small areas of scabbing on face, extremities and back. Face with scabbing prominent on chin (improving)   Psychiatric: She has a normal mood and affect.       Significant Labs:   CBC:   Recent Labs  Lab 04/04/18  0505   WBC 6.43   HGB 8.9*   HCT 28.1*        CMP:   Recent Labs  Lab 04/03/18  1013 04/04/18  0507    141   K 4.4 4.2    109   CO2 23 25   GLU 98 98   BUN 18 16   CREATININE 0.8 0.7   CALCIUM 9.1 8.7   PROT  --  6.5   ALBUMIN  --  2.9*   BILITOT  --  0.2   ALKPHOS  --  46*   AST  --  9*   ALT  --  10   ANIONGAP 10 7*   EGFRNONAA >60.0 >60.0       Significant Imaging: I have reviewed and interpreted all pertinent imaging results/findings within the past 24 hours.    Assessment/Plan:      * Gram-positive bacteremia    Patient with tachycardia and fever up to 103.5 in setting of IVDU also has multiple pustules on exam and facial lesion consistent with impetigo  Procal 17.89, leukocytosis 13.71 on admission  Secondary to Group A strep and MRSA bacteremia  Currently CXR not consistent with PNA, influenza negative  Given 30cc/kg zosyn and vanc in the ED  Bactroban for impetigo to finish 4/2  DC vanc 3/27 and continued rocephin; transitioned to IV PCN 3/28. Vanc restarted given new growth of MRSA  TTE ordered, no vegetations, ordered LIZET, no vegetations  HIV testing ordered, negative  Blood Cx x 2 drawn in the Ed, 2/2 bottles growing strep pyogenes, repeated cultures 3/26 negative, gave IV PCN for one day, MRSA in cultures from 3/25 resulted 3/29, started IV vancomycin, end date April 9, 2018  PICC consult for PICC. Patient previously refused 3/31, says she is amendable, re-consulted 4/2 for midline          Anemia    Hemodynamically stable  May be due to lab error  No evidence of GI bleeding.   Will repeat labs and continue to monitor.         Opioid use disorder, severe, dependence    Plans for rehab with [facility]  Discussed with psych.          Active intravenous drug use    Patient everyday heroin/fentanyl  Prn clonidine, loperamide, ibuprofen, and tizanidine for withdrawal symptoms  Consult to addiction psych who has started methadone with taper completed taper 3/29, plan for rehab patient amendable          Chest pain    Reproducible on exam  EKG without STEMI or depressions  No other cardiac risk factors other than IV cocaine use intermittently; however denies use the week of admission   MRI spine ordered 2/2 to recent MRSA isolated in blood from 3/25, negative for osteomyelitis, showed subsegmental opacities   CT chest ordered to further investigate 3/30 found scattered nodules, with one 2.3 x 1.9 with concern for possible abscess. CT repeated, nodules are unchanged in size. Likely MSK, no evidence of skeletal disease on imagine, prn toradol ordered for 3 days, improved today 3/31            Hepatitis C    Reported per patient who does not have medical care, patient discovered 6 mos ago from needle exchange service; not currently being treated  Hep C RNA + and viral rna detected  Hep B surface antibody positive, ordered Hep e testing and Hep B core total, negative  Will follow up with ID as outpatient.             VTE Risk Mitigation         Ordered     IP VTE LOW RISK PATIENT  Once      03/25/18 1900          Dispo: pending completion of IV antibiotic therapy    Austin Lopez MD  Department of Hospital Medicine   [facility]

## 2018-04-04 NOTE — ASSESSMENT & PLAN NOTE
Reported per patient who does not have medical care, patient discovered 6 mos ago from needle exchange service; not currently being treated  Hep C RNA + and viral rna detected  Hep B surface antibody positive, ordered Hep e testing and Hep B core total, negative  Will follow up with ID as outpatient.

## 2018-04-04 NOTE — ASSESSMENT & PLAN NOTE
Hemodynamically stable  May be due to lab error  No evidence of GI bleeding.   Will repeat labs and continue to monitor.

## 2018-04-04 NOTE — SUBJECTIVE & OBJECTIVE
Interval History: No problems over night.    Review of Systems   Constitutional: Negative for appetite change, chills and diaphoresis.   Eyes: Negative for visual disturbance.   Respiratory: Negative for shortness of breath.    Cardiovascular: Negative for chest pain.   Gastrointestinal: Negative for constipation.   Genitourinary: Negative for dysuria and hematuria.   Musculoskeletal: Negative for arthralgias and back pain.   Neurological: Negative for numbness and headaches.   Psychiatric/Behavioral: Negative for confusion and hallucinations.     Objective:     Vital Signs (Most Recent):  Temp: 97.9 °F (36.6 °C) (04/04/18 0738)  Pulse: 98 (04/04/18 0738)  Resp: 14 (04/04/18 0738)  BP: 127/74 (04/04/18 0738)  SpO2: 97 % (04/04/18 0738) Vital Signs (24h Range):  Temp:  [97.9 °F (36.6 °C)-99.1 °F (37.3 °C)] 97.9 °F (36.6 °C)  Pulse:  [] 98  Resp:  [14-18] 14  SpO2:  [96 %-99 %] 97 %  BP: (127-144)/(64-85) 127/74     Weight: 68 kg (150 lb)  Body mass index is 27.44 kg/m².    Intake/Output Summary (Last 24 hours) at 04/04/18 1143  Last data filed at 04/03/18 2151   Gross per 24 hour   Intake              250 ml   Output                0 ml   Net              250 ml      Physical Exam   Constitutional: She is oriented to person, place, and time. No distress.   HENT:   Mouth/Throat: Oropharynx is clear and moist.   Eyes: Conjunctivae and EOM are normal. No scleral icterus.   Neck: Normal range of motion. No JVD present.   Cardiovascular: Normal rate, regular rhythm and normal heart sounds.  Exam reveals no gallop and no friction rub.    No murmur heard.  Pulmonary/Chest: Effort normal and breath sounds normal. She has no wheezes. She has no rales. She exhibits tenderness.   Abdominal: Soft. Bowel sounds are normal. She exhibits no distension. There is no tenderness.   Musculoskeletal: She exhibits no edema.   Neurological: She is alert and oriented to person, place, and time. No sensory deficit.   Skin: Skin is  warm. She is not diaphoretic.   Multiple pustule lesions and small areas of scabbing on face, extremities and back. Face with scabbing prominent on chin (improving)   Psychiatric: She has a normal mood and affect.       Significant Labs:   CBC:   Recent Labs  Lab 04/04/18  0505   WBC 6.43   HGB 8.9*   HCT 28.1*        CMP:   Recent Labs  Lab 04/03/18  1013 04/04/18  0507    141   K 4.4 4.2    109   CO2 23 25   GLU 98 98   BUN 18 16   CREATININE 0.8 0.7   CALCIUM 9.1 8.7   PROT  --  6.5   ALBUMIN  --  2.9*   BILITOT  --  0.2   ALKPHOS  --  46*   AST  --  9*   ALT  --  10   ANIONGAP 10 7*   EGFRNONAA >60.0 >60.0       Significant Imaging: I have reviewed and interpreted all pertinent imaging results/findings within the past 24 hours.

## 2018-04-05 LAB
AMPHET+METHAMPHET UR QL: NEGATIVE
BARBITURATES UR QL SCN>200 NG/ML: NEGATIVE
BASOPHILS # BLD AUTO: 0.05 K/UL
BASOPHILS NFR BLD: 0.9 %
BENZODIAZ UR QL SCN>200 NG/ML: NEGATIVE
BZE UR QL SCN: NEGATIVE
CANNABINOIDS UR QL SCN: NEGATIVE
CREAT UR-MCNC: 82 MG/DL
DIFFERENTIAL METHOD: ABNORMAL
EOSINOPHIL # BLD AUTO: 0.1 K/UL
EOSINOPHIL NFR BLD: 1.9 %
ERYTHROCYTE [DISTWIDTH] IN BLOOD BY AUTOMATED COUNT: 13.6 %
ETHANOL UR-MCNC: <10 MG/DL
HCT VFR BLD AUTO: 31.2 %
HGB BLD-MCNC: 10.5 G/DL
IMM GRANULOCYTES # BLD AUTO: 0.03 K/UL
IMM GRANULOCYTES NFR BLD AUTO: 0.5 %
LYMPHOCYTES # BLD AUTO: 2 K/UL
LYMPHOCYTES NFR BLD: 34.8 %
MCH RBC QN AUTO: 27.7 PG
MCHC RBC AUTO-ENTMCNC: 33.7 G/DL
MCV RBC AUTO: 82 FL
METHADONE UR QL SCN>300 NG/ML: NEGATIVE
MONOCYTES # BLD AUTO: 0.5 K/UL
MONOCYTES NFR BLD: 9.1 %
NEUTROPHILS # BLD AUTO: 3.1 K/UL
NEUTROPHILS NFR BLD: 52.8 %
NRBC BLD-RTO: 0 /100 WBC
OPIATES UR QL SCN: NORMAL
PCP UR QL SCN>25 NG/ML: NEGATIVE
PLATELET # BLD AUTO: 289 K/UL
PMV BLD AUTO: 8.7 FL
RBC # BLD AUTO: 3.79 M/UL
TOXICOLOGY INFORMATION: NORMAL
VANCOMYCIN TROUGH SERPL-MCNC: 17.1 UG/ML
WBC # BLD AUTO: 5.83 K/UL

## 2018-04-05 PROCEDURE — 63600175 PHARM REV CODE 636 W HCPCS: Performed by: STUDENT IN AN ORGANIZED HEALTH CARE EDUCATION/TRAINING PROGRAM

## 2018-04-05 PROCEDURE — 25000003 PHARM REV CODE 250: Performed by: STUDENT IN AN ORGANIZED HEALTH CARE EDUCATION/TRAINING PROGRAM

## 2018-04-05 PROCEDURE — 99231 SBSQ HOSP IP/OBS SF/LOW 25: CPT | Mod: ,,, | Performed by: HOSPITALIST

## 2018-04-05 PROCEDURE — 80202 ASSAY OF VANCOMYCIN: CPT

## 2018-04-05 PROCEDURE — 12000002 HC ACUTE/MED SURGE SEMI-PRIVATE ROOM

## 2018-04-05 PROCEDURE — 80307 DRUG TEST PRSMV CHEM ANLYZR: CPT

## 2018-04-05 PROCEDURE — 85025 COMPLETE CBC W/AUTO DIFF WBC: CPT

## 2018-04-05 RX ADMIN — MUPIROCIN: 20 OINTMENT TOPICAL at 08:04

## 2018-04-05 RX ADMIN — Medication 1250 MG: at 04:04

## 2018-04-05 RX ADMIN — KETOROLAC TROMETHAMINE 30 MG: 15 INJECTION, SOLUTION INTRAMUSCULAR; INTRAVENOUS at 09:04

## 2018-04-05 RX ADMIN — TIZANIDINE 4 MG: 2 TABLET ORAL at 09:04

## 2018-04-05 RX ADMIN — Medication 1250 MG: at 08:04

## 2018-04-05 RX ADMIN — Medication 1250 MG: at 12:04

## 2018-04-05 NOTE — PROGRESS NOTES
[facility] Hospital Medicine  Progress Note    Patient Name: Kimberlee Ling  MRN: [id number]  Patient Class: IP- Inpatient   Admission Date: 3/25/2018  Length of Stay: 11 days  Attending Physician: Viktoria Jo MD  Primary Care Provider: Primary Doctor [facility] Medicine Team: [facility] HOSP MED 4 Austin Lopez MD    Subjective:     Principal Problem:Gram-positive bacteremia    HPI:  33yoF with PMHx of reported Hep C and IVDU presents with 2 day hx of fevers with associated SOB, sharp reproducible chest pain radiating to back, myalgias, and one loose bowel movement. Patient claims she has had flu like symptoms before but this different. Patient claims her and her boyfriend live in a tent and he has similar symptoms. Patient says she has noticed areas of pus throughout her arms and has been picking at her face and says that seems infected. Patient denies nausea, vomiting, cough, or dysuria. Patient uses heroin daily and injects cocaine once weekly, last used heroin yesterday. Patient claims she learned she had Hep C from needle exchange clinic 6 mos ago and has never received treatment.    Hospital Course:  Admitted to hospital medicine started on vanc and rocephin. 3/26 2/2 bottles growing strep. Obtained LIZET, no vegetation found 3/27. Patient transitioned to IV PCN for strep pyogenes 3/28; however grew MRSA from blood cultures from 3/25, repeated cultures 3/29 and started patient on vancomycin. Patient placed on methadone taper per addiction psych completed 3/29. Patient continues to have sharp chest and back pain on palpation and deep breathing. MRI spine ordered to investigate for osteomyelitis, unrevealing but for sugsegmental consolidation in lungs, ordered CT chest, found scattered nodules, with one 2.3 x 1.9 with concern for possible abscess. Will repeat CT near patient's discharge after abx completion. Patient difficulty with maintaining PIV, PICC consult for midline insertion  4/2.      Interval History: Reports feeling sleepy today. No problems over night.     Review of Systems   Constitutional: Negative for appetite change, chills and diaphoresis.   Eyes: Negative for visual disturbance.   Respiratory: Negative for shortness of breath.    Cardiovascular: Negative for chest pain.   Gastrointestinal: Negative for constipation.   Genitourinary: Negative for dysuria and hematuria.   Musculoskeletal: Negative for arthralgias and back pain.   Neurological: Negative for numbness and headaches.   Psychiatric/Behavioral: Negative for confusion and hallucinations.     Objective:     Vital Signs (Most Recent):  Temp: 97.6 °F (36.4 °C) (04/05/18 1116)  Pulse: (!) 123 (04/05/18 1116)  Resp: 18 (04/05/18 1116)  BP: (!) 133/94 (04/05/18 1116)  SpO2: (!) 93 % (04/05/18 1116) Vital Signs (24h Range):  Temp:  [97.6 °F (36.4 °C)-98.4 °F (36.9 °C)] 97.6 °F (36.4 °C)  Pulse:  [] 123  Resp:  [17-20] 18  SpO2:  [93 %-99 %] 93 %  BP: (133-149)/(78-94) 133/94     Weight: 68 kg (150 lb)  Body mass index is 27.44 kg/m².    Intake/Output Summary (Last 24 hours) at 04/05/18 1301  Last data filed at 04/04/18 2019   Gross per 24 hour   Intake              240 ml   Output                0 ml   Net              240 ml      Physical Exam   Constitutional: She is oriented to person, place, and time. No distress.   HENT:   Mouth/Throat: Oropharynx is clear and moist.   Eyes: Conjunctivae and EOM are normal. No scleral icterus.   Neck: Normal range of motion. No JVD present.   Cardiovascular: Normal rate, regular rhythm and normal heart sounds.  Exam reveals no gallop and no friction rub.    No murmur heard.  Pulmonary/Chest: Effort normal and breath sounds normal. She has no wheezes. She has no rales. She exhibits tenderness.   Abdominal: Soft. Bowel sounds are normal. She exhibits no distension. There is no tenderness.   Musculoskeletal: She exhibits no edema.   Neurological: She is alert and oriented to person,  place, and time. No sensory deficit.   Skin: Skin is warm. She is not diaphoretic.   Multiple pustule lesions and small areas of scabbing on face, extremities and back. Face with scabbing prominent on chin (improving)   Psychiatric: She has a normal mood and affect.       Significant Labs:   BMP:   Recent Labs  Lab 04/04/18  0507   GLU 98      K 4.2      CO2 25   BUN 16   CREATININE 0.7   CALCIUM 8.7   MG 2.0     CBC:   Recent Labs  Lab 04/04/18  0505 04/05/18  0349   WBC 6.43 5.83   HGB 8.9* 10.5*   HCT 28.1* 31.2*    289       Significant Imaging: I have reviewed and interpreted all pertinent imaging results/findings within the past 24 hours.    Assessment/Plan:      * Gram-positive bacteremia    Patient with tachycardia and fever up to 103.5 in setting of IVDU also has multiple pustules on exam and facial lesion consistent with impetigo  Procal 17.89, leukocytosis 13.71 on admission  Secondary to Group A strep and MRSA bacteremia  Currently CXR not consistent with PNA, influenza negative  Given 30cc/kg zosyn and vanc in the ED  Bactroban for impetigo to finish 4/2  DC vanc 3/27 and continued rocephin; transitioned to IV PCN 3/28. Vanc restarted given new growth of MRSA  TTE ordered, no vegetations,   HIV testing ordered, negative  Blood Cx x 2 drawn in the Ed, 2/2 bottles growing strep pyogenes, repeated cultures 3/26 negative, gave IV PCN for one day, MRSA in cultures from 3/25 resulted 3/29, started IV vancomycin, end date April 9, 2018  PICC consult for PICC. Patient previously refused 3/31, says she is amendable, re-consulted 4/2 for midline          Anemia    Hemodynamically stable  May be due to lab error  No evidence of GI bleeding.   Will repeat labs and continue to monitor.         Opioid use disorder, severe, dependence    Plans for rehab with [facility]  Discussed with psych.          Active intravenous drug use    Patient everyday heroin/fentanyl  Prn clonidine, loperamide,  ibuprofen, and tizanidine for withdrawal symptoms  Consult to addiction psych who has started methadone with taper completed taper 3/29, plan for rehab patient amendable          Chest pain    Reproducible on exam  EKG without STEMI or depressions  No other cardiac risk factors other than IV cocaine use intermittently; however denies use the week of admission   MRI spine ordered 2/2 to recent MRSA isolated in blood from 3/25, negative for osteomyelitis, showed subsegmental opacities   CT chest ordered to further investigate 3/30 found scattered nodules, with one 2.3 x 1.9 with concern for possible abscess. CT repeated, nodules are unchanged in size. Likely MSK, no evidence of skeletal disease on imagine, prn toradol ordered for 3 days, improved today 3/31            Hepatitis C    Reported per patient who does not have medical care, patient discovered 6 mos ago from needle exchange service; not currently being treated  Hep C RNA + and viral rna detected  Hep B surface antibody positive, ordered Hep e testing and Hep B core total, negative  Will follow up with ID as outpatient.             VTE Risk Mitigation         Ordered     IP VTE LOW RISK PATIENT  Once      03/25/18 1900              Austin Lopez MD  Department of Hospital Medicine   [facility]

## 2018-04-05 NOTE — PROGRESS NOTES
Patient left unit with boyfriend for approximately 45 minutes.  when patient returned to unit she had slurred speech and bizarre behavior.  Patient denies use of any illicit drugs while off of unit.  Slurred speech and behavior observed by multiple staff members. Primary team notified.

## 2018-04-05 NOTE — PLAN OF CARE
Problem: Patient Care Overview  Goal: Plan of Care Review  Slept Most of this shift. VSS. Denies any complaint. No falls.

## 2018-04-05 NOTE — ASSESSMENT & PLAN NOTE
Patient with tachycardia and fever up to 103.5 in setting of IVDU also has multiple pustules on exam and facial lesion consistent with impetigo  Procal 17.89, leukocytosis 13.71 on admission  Secondary to Group A strep and MRSA bacteremia  Currently CXR not consistent with PNA, influenza negative  Given 30cc/kg zosyn and vanc in the ED  Bactroban for impetigo to finish 4/2  DC vanc 3/27 and continued rocephin; transitioned to IV PCN 3/28. Vanc restarted given new growth of MRSA  TTE ordered, no vegetations,   HIV testing ordered, negative  Blood Cx x 2 drawn in the Ed, 2/2 bottles growing strep pyogenes, repeated cultures 3/26 negative, gave IV PCN for one day, MRSA in cultures from 3/25 resulted 3/29, started IV vancomycin, end date April 9, 2018  PICC consult for PICC. Patient previously refused 3/31, says she is amendable, re-consulted 4/2 for midline

## 2018-04-05 NOTE — SUBJECTIVE & OBJECTIVE
Interval History: Reports feeling sleepy today. No problems over night.     Review of Systems   Constitutional: Negative for appetite change, chills and diaphoresis.   Eyes: Negative for visual disturbance.   Respiratory: Negative for shortness of breath.    Cardiovascular: Negative for chest pain.   Gastrointestinal: Negative for constipation.   Genitourinary: Negative for dysuria and hematuria.   Musculoskeletal: Negative for arthralgias and back pain.   Neurological: Negative for numbness and headaches.   Psychiatric/Behavioral: Negative for confusion and hallucinations.     Objective:     Vital Signs (Most Recent):  Temp: 97.6 °F (36.4 °C) (04/05/18 1116)  Pulse: (!) 123 (04/05/18 1116)  Resp: 18 (04/05/18 1116)  BP: (!) 133/94 (04/05/18 1116)  SpO2: (!) 93 % (04/05/18 1116) Vital Signs (24h Range):  Temp:  [97.6 °F (36.4 °C)-98.4 °F (36.9 °C)] 97.6 °F (36.4 °C)  Pulse:  [] 123  Resp:  [17-20] 18  SpO2:  [93 %-99 %] 93 %  BP: (133-149)/(78-94) 133/94     Weight: 68 kg (150 lb)  Body mass index is 27.44 kg/m².    Intake/Output Summary (Last 24 hours) at 04/05/18 1301  Last data filed at 04/04/18 2019   Gross per 24 hour   Intake              240 ml   Output                0 ml   Net              240 ml      Physical Exam   Constitutional: She is oriented to person, place, and time. No distress.   HENT:   Mouth/Throat: Oropharynx is clear and moist.   Eyes: Conjunctivae and EOM are normal. No scleral icterus.   Neck: Normal range of motion. No JVD present.   Cardiovascular: Normal rate, regular rhythm and normal heart sounds.  Exam reveals no gallop and no friction rub.    No murmur heard.  Pulmonary/Chest: Effort normal and breath sounds normal. She has no wheezes. She has no rales. She exhibits tenderness.   Abdominal: Soft. Bowel sounds are normal. She exhibits no distension. There is no tenderness.   Musculoskeletal: She exhibits no edema.   Neurological: She is alert and oriented to person, place, and  time. No sensory deficit.   Skin: Skin is warm. She is not diaphoretic.   Multiple pustule lesions and small areas of scabbing on face, extremities and back. Face with scabbing prominent on chin (improving)   Psychiatric: She has a normal mood and affect.       Significant Labs:   BMP:   Recent Labs  Lab 04/04/18  0507   GLU 98      K 4.2      CO2 25   BUN 16   CREATININE 0.7   CALCIUM 8.7   MG 2.0     CBC:   Recent Labs  Lab 04/04/18  0505 04/05/18  0349   WBC 6.43 5.83   HGB 8.9* 10.5*   HCT 28.1* 31.2*    289       Significant Imaging: I have reviewed and interpreted all pertinent imaging results/findings within the past 24 hours.

## 2018-04-06 PROBLEM — F19.90 ACTIVE INTRAVENOUS DRUG USE: Status: ACTIVE | Noted: 2018-04-06

## 2018-04-06 LAB
BASOPHILS # BLD AUTO: 0.03 K/UL
BASOPHILS NFR BLD: 0.4 %
DIFFERENTIAL METHOD: ABNORMAL
EOSINOPHIL # BLD AUTO: 0.1 K/UL
EOSINOPHIL NFR BLD: 1.1 %
ERYTHROCYTE [DISTWIDTH] IN BLOOD BY AUTOMATED COUNT: 14.1 %
HCT VFR BLD AUTO: 30.1 %
HGB BLD-MCNC: 9.6 G/DL
IMM GRANULOCYTES # BLD AUTO: 0.04 K/UL
IMM GRANULOCYTES NFR BLD AUTO: 0.5 %
LYMPHOCYTES # BLD AUTO: 1.6 K/UL
LYMPHOCYTES NFR BLD: 19.8 %
MCH RBC QN AUTO: 27.3 PG
MCHC RBC AUTO-ENTMCNC: 31.9 G/DL
MCV RBC AUTO: 86 FL
MONOCYTES # BLD AUTO: 0.7 K/UL
MONOCYTES NFR BLD: 8.4 %
NEUTROPHILS # BLD AUTO: 5.7 K/UL
NEUTROPHILS NFR BLD: 69.8 %
NRBC BLD-RTO: 0 /100 WBC
PLATELET # BLD AUTO: 276 K/UL
PMV BLD AUTO: 9.1 FL
RBC # BLD AUTO: 3.52 M/UL
VANCOMYCIN TROUGH SERPL-MCNC: 44.1 UG/ML
WBC # BLD AUTO: 8.22 K/UL

## 2018-04-06 PROCEDURE — 63600175 PHARM REV CODE 636 W HCPCS: Performed by: STUDENT IN AN ORGANIZED HEALTH CARE EDUCATION/TRAINING PROGRAM

## 2018-04-06 PROCEDURE — 80202 ASSAY OF VANCOMYCIN: CPT

## 2018-04-06 PROCEDURE — 99233 SBSQ HOSP IP/OBS HIGH 50: CPT | Mod: ,,, | Performed by: HOSPITALIST

## 2018-04-06 PROCEDURE — 25000003 PHARM REV CODE 250: Performed by: STUDENT IN AN ORGANIZED HEALTH CARE EDUCATION/TRAINING PROGRAM

## 2018-04-06 PROCEDURE — 85025 COMPLETE CBC W/AUTO DIFF WBC: CPT

## 2018-04-06 PROCEDURE — 25000003 PHARM REV CODE 250: Performed by: HOSPITALIST

## 2018-04-06 PROCEDURE — 12000002 HC ACUTE/MED SURGE SEMI-PRIVATE ROOM

## 2018-04-06 RX ADMIN — Medication 1250 MG: at 03:04

## 2018-04-06 RX ADMIN — ONDANSETRON 8 MG: 8 TABLET, ORALLY DISINTEGRATING ORAL at 10:04

## 2018-04-06 RX ADMIN — MUPIROCIN: 20 OINTMENT TOPICAL at 08:04

## 2018-04-06 NOTE — PROGRESS NOTES
[facility] Hospital Medicine  Progress Note    Patient Name: Kimberlee Ling  MRN: [id number]  Patient Class: IP- Inpatient   Admission Date: 3/25/2018  Length of Stay: 12 days  Attending Physician: Viktoria Jo MD  Primary Care Provider: Primary Doctor [facility] Medicine Team: [facility]OSP MED 4 José Dodd MD    Subjective:     Principal Problem:Gram-positive bacteremia    HPI:  33yoF with PMHx of reported Hep C and IVDU presents with 2 day hx of fevers with associated SOB, sharp reproducible chest pain radiating to back, myalgias, and one loose bowel movement. Patient claims she has had flu like symptoms before but this different. Patient claims her and her boyfriend live in a tent and he has similar symptoms. Patient says she has noticed areas of pus throughout her arms and has been picking at her face and says that seems infected. Patient denies nausea, vomiting, cough, or dysuria. Patient uses heroin daily and injects cocaine once weekly, last used heroin yesterday. Patient claims she learned she had Hep C from needle exchange clinic 6 mos ago and has never received treatment.    Hospital Course:  Admitted to hospital medicine started on vanc and rocephin. 3/26 2/2 bottles growing strep. Obtained LIZET, no vegetation found 3/27. Patient transitioned to IV PCN for strep pyogenes 3/28; however grew MRSA from blood cultures from 3/25, repeated cultures 3/29 and started patient on vancomycin. Patient placed on methadone taper per addiction psych completed 3/29. Patient continues to have sharp chest and back pain on palpation and deep breathing. MRI spine ordered to investigate for osteomyelitis, unrevealing but for sugsegmental consolidation in lungs, ordered CT chest, found scattered nodules, with one 2.3 x 1.9 with concern for possible abscess. Will repeat CT near patient's discharge after abx completion. Patient difficulty with maintaining PIV, PICC consult for midline insertion  4/2.      Interval History: Significant event yesterday evening when patient off the floor.  When returning to room, she was noted to be more lethargic than previous.  Otherwise, patient noted to have been combative, requiring 3 security officers to be present at room.  Noted to have found drug  paraphernalia  in possession.  Urine tox screen positive for opiates, last opiate medication given was methadone in March.  Otherwise, patient in AM notes mild lethargy, attributing to poor sleep/wake cycle.      Review of Systems   Constitutional: Negative for appetite change, chills and diaphoresis.   Eyes: Negative for visual disturbance.   Respiratory: Negative for shortness of breath.    Cardiovascular: Negative for chest pain.   Gastrointestinal: Negative for constipation.   Genitourinary: Negative for dysuria and hematuria.   Musculoskeletal: Negative for arthralgias and back pain.   Neurological: Negative for numbness and headaches.   Psychiatric/Behavioral: Negative for confusion and hallucinations.     Objective:     Vital Signs (Most Recent):  Temp: 97.4 °F (36.3 °C) (04/06/18 1119)  Pulse: 89 (04/06/18 1119)  Resp: 18 (04/06/18 1119)  BP: 117/73 (04/06/18 1119)  SpO2: 97 % (04/06/18 1119) Vital Signs (24h Range):  Temp:  [97.4 °F (36.3 °C)-98.5 °F (36.9 °C)] 97.4 °F (36.3 °C)  Pulse:  [] 89  Resp:  [16-18] 18  SpO2:  [93 %-98 %] 97 %  BP: (111-146)/(60-73) 117/73     Weight: 68 kg (150 lb)  Body mass index is 27.44 kg/m².    Intake/Output Summary (Last 24 hours) at 04/06/18 1445  Last data filed at 04/06/18 1148   Gross per 24 hour   Intake             1330 ml   Output                0 ml   Net             1330 ml      Physical Exam   Constitutional: She is oriented to person, place, and time. No distress.   HENT:   Mouth/Throat: Oropharynx is clear and moist.   Eyes: Conjunctivae and EOM are normal. No scleral icterus.   Neck: Normal range of motion. No JVD present.   Cardiovascular: Normal rate, regular  rhythm and normal heart sounds.  Exam reveals no gallop and no friction rub.    No murmur heard.  Pulmonary/Chest: Effort normal and breath sounds normal. She has no wheezes. She has no rales. She exhibits tenderness.   Abdominal: Soft. Bowel sounds are normal. She exhibits no distension. There is no tenderness.   Musculoskeletal: She exhibits no edema.   Neurological: She is alert and oriented to person, place, and time. No sensory deficit.   Skin: Skin is warm. She is not diaphoretic.   Multiple pustule lesions and small areas of scabbing on face, extremities and back. Face with scabbing prominent on chin (improving)   Psychiatric: She has a normal mood and affect.       Significant Labs:     Recent Results (from the past 24 hour(s))   Toxicology screen, urine    Collection Time: 04/05/18  6:57 PM   Result Value Ref Range    Alcohol, Urine <10 <10 mg/dL    Benzodiazepines Negative     Methadone metabolites Negative     Cocaine (Metab.) Negative     Opiate Scrn, Ur Presumptive Positive     Barbiturate Screen, Ur Negative     Amphetamine Screen, Ur Negative     THC Negative     Phencyclidine Negative     Creatinine, Random Ur 82.0 15.0 - 325.0 mg/dL    Toxicology Information SEE COMMENT    CBC auto differential    Collection Time: 04/06/18  3:20 AM   Result Value Ref Range    WBC 8.22 3.90 - 12.70 K/uL    RBC 3.52 (L) 4.00 - 5.40 M/uL    Hemoglobin 9.6 (L) 12.0 - 16.0 g/dL    Hematocrit 30.1 (L) 37.0 - 48.5 %    MCV 86 82 - 98 fL    MCH 27.3 27.0 - 31.0 pg    MCHC 31.9 (L) 32.0 - 36.0 g/dL    RDW 14.1 11.5 - 14.5 %    Platelets 276 150 - 350 K/uL    MPV 9.1 (L) 9.2 - 12.9 fL    Immature Granulocytes 0.5 0.0 - 0.5 %    Gran # (ANC) 5.7 1.8 - 7.7 K/uL    Immature Grans (Abs) 0.04 0.00 - 0.04 K/uL    Lymph # 1.6 1.0 - 4.8 K/uL    Mono # 0.7 0.3 - 1.0 K/uL    Eos # 0.1 0.0 - 0.5 K/uL    Baso # 0.03 0.00 - 0.20 K/uL    nRBC 0 0 /100 WBC    Gran% 69.8 38.0 - 73.0 %    Lymph% 19.8 18.0 - 48.0 %    Mono% 8.4 4.0 - 15.0 %     Eosinophil% 1.1 0.0 - 8.0 %    Basophil% 0.4 0.0 - 1.9 %    Differential Method Automated    VANCOMYCIN, TROUGH before 4th dose    Collection Time: 04/06/18  3:22 AM   Result Value Ref Range    Vancomycin-Trough 44.1 () 10.0 - 22.0 ug/mL         Significant Imaging: I have reviewed and interpreted all pertinent imaging results/findings within the past 24 hours.    Assessment/Plan:      * Gram-positive bacteremia    - Patient with tachycardia and fever up to 103.5 in setting of IVDU also has multiple pustules on exam and facial lesion consistent with impetigo  - Procal 17.89, leukocytosis 13.71 on admission  - Secondary to Group A strep and MRSA bacteremia  - Currently CXR not consistent with PNA, influenza negative  - Given 30cc/kg zosyn and vanc in the ED  - Bactroban for impetigo to finish 4/2  - DC vanc 3/27 and continued rocephin; transitioned to IV PCN 3/28. Vanc restarted given new growth of MRSA  - TTE ordered, no vegetations,   - HIV testing ordered, negative  - Blood Cx x 2 drawn in the Ed, 2/2 bottles growing strep pyogenes, repeated cultures 3/26 negative, gave IV PCN for one day, MRSA in cultures from 3/25 resulted 3/29, started IV vancomycin, end date April 9, 2018  - PICC consult for PICC. Patient previously refused 3/31, says she is amendable, re-consulted 4/2 for midline  - Patient previously notes wanting to leave AMA once significant other is relocated to LTAC.  Patient explained risks and benefits, but notes no change in mind.  If significant other has placement, will anticipate patient leaving AMA          Active intravenous drug use    Patient everyday heroin/fentanyl  Prn clonidine, loperamide, ibuprofen, and tizanidine for withdrawal symptoms  Consult to addiction psych who has started methadone with taper completed taper 3/29, plan for rehab patient amendable          Opioid use disorder, severe, dependence    Plans for rehab with [facility]  Discussed with psych.          Anemia     Hemodynamically stable  May be due to lab error  No evidence of GI bleeding.   Will repeat labs and continue to monitor.         Chest pain    Reproducible on exam  EKG without STEMI or depressions  No other cardiac risk factors other than IV cocaine use intermittently; however denies use the week of admission   MRI spine ordered 2/2 to recent MRSA isolated in blood from 3/25, negative for osteomyelitis, showed subsegmental opacities   CT chest ordered to further investigate 3/30 found scattered nodules, with one 2.3 x 1.9 with concern for possible abscess. CT repeated, nodules are unchanged in size. Likely MSK, no evidence of skeletal disease on imagine, prn toradol ordered for 3 days, improved today 3/31            Hepatitis C    Reported per patient who does not have medical care, patient discovered 6 mos ago from needle exchange service; not currently being treated  Hep C RNA + and viral rna detected  Hep B surface antibody positive, ordered Hep e testing and Hep B core total, negative  Will follow up with ID as outpatient.             VTE Risk Mitigation         Ordered     IP VTE LOW RISK PATIENT  Once      03/25/18 1900              José Dodd MD  Department of Hospital Medicine   [facility]

## 2018-04-06 NOTE — NURSING
PRIMARY TEAM MD NOTIFIED OF PATIENT'S ABSENCE OFF THE UNIT FOR GREATER THAN 30 MINUTES. ALSO, STATED TO MD THAT PATIENT APPEARED DROWSY AND LETHARGIC DURING AM ROUNDS. MD VERBALIZED UNDERSTANDING AND STATED THAT SHE WOULD CONTACT HER ATTENDING MD TO OBTAIN FURTHER ORDERS PER PROTOCOL. WILL MONITOR FOR FUTURE ORDERS.

## 2018-04-06 NOTE — NURSING
SPOKE WITH PATIENT AND PRESENTED PATIENT WITH AMA FORM. PATIENT STATED THAT SHE IS GOING TO STAY INPATIENT AND ABIDE BY THE RULES THAT THE PHYSICIAN PRESENTED TO HER. THE PATIENT ALSO STATED THAT SHE WILL LEAVE AMA IN THE EVENT THAT HER SIGNIFICANT OTHER IS DISCHARGED TO LTAC TODAY. UNIT DIRECTOR AND PRIMARY TEAM PHYSICIAN NOTIFIED.

## 2018-04-06 NOTE — NURSING
PRIMARY NURSE, UNIT DIRECTOR AND PRIMARY TEAM PHYSICIAN SPOKE WITH PATIENT IN REGARDS OF NOT LEAVING THE FLOOR AT ALL. PATIENT AGREED. DISCHARGE ORDERS REMOVED AND PATIENT REMAINS INPATIENT. WILL CONTINUE TO MONITOR PATIENT'S STATUS.

## 2018-04-06 NOTE — NURSING
MD STATED THAT PATIENT HAS ALREADY BEEN DISCHARGED FROM THE INPATIENT SYSTEM; THEREFORE, SHE MUST LEAVE AMA AS DISCUSSED. MD STATED THAT IF PATIENT REQUEST FURTHER INPATIENT STAY THEN SHE MAY VISIT THE EMERGENCY ROOM FOR FURTHER TREATMENT.

## 2018-04-06 NOTE — NURSING
Patient and significant other have left the floor twice during the shift. No prns dispense. Vancomycin trough drawn this am; MARYANNE Ch called per laboratory, level 44.1. MARYANNE Ch stopped dose of vancomycin currently infusing. MT 4 coverage paged and made aware; will continue to monitor.

## 2018-04-06 NOTE — ASSESSMENT & PLAN NOTE
- Patient with tachycardia and fever up to 103.5 in setting of IVDU also has multiple pustules on exam and facial lesion consistent with impetigo  - Procal 17.89, leukocytosis 13.71 on admission  - Secondary to Group A strep and MRSA bacteremia  - Currently CXR not consistent with PNA, influenza negative  - Given 30cc/kg zosyn and vanc in the ED  - Bactroban for impetigo to finish 4/2  - DC vanc 3/27 and continued rocephin; transitioned to IV PCN 3/28. Vanc restarted given new growth of MRSA  - TTE ordered, no vegetations,   - HIV testing ordered, negative  - Blood Cx x 2 drawn in the Ed, 2/2 bottles growing strep pyogenes, repeated cultures 3/26 negative, gave IV PCN for one day, MRSA in cultures from 3/25 resulted 3/29, started IV vancomycin, end date April 9, 2018  - PICC consult for PICC. Patient previously refused 3/31, says she is amendable, re-consulted 4/2 for midline  - Patient previously notes wanting to leave AMA once significant other is relocated to LTAC.  Patient explained risks and benefits, but notes no change in mind.  If significant other has placement, will anticipate patient leaving AMA

## 2018-04-06 NOTE — NURSING
PATIENT AND SIGNIFICANT OTHER NOTED TO BE BACK IN ROOM. UNAWARE OF WHAT TIME PATIENT ARRIVED BACK ONTO UNIT. WILL MONITOR PATIENT'S BEHAVIOR AND VITAL SIGNS.

## 2018-04-06 NOTE — SUBJECTIVE & OBJECTIVE
Interval History: Significant event yesterday evening when patient off the floor.  When returning to room, she was noted to be more lethargic than previous.  Otherwise, patient noted to have been combative, requiring 3 security officers to be present at room.  Noted to have found drug  paraphernalia in possession.  Urine tox screen positive for opiates, last opiate medication given was methadone in March.  Otherwise, patient in AM notes mild lethargy, attributing to poor sleep/wake cycle.      Review of Systems   Constitutional: Negative for appetite change, chills and diaphoresis.   Eyes: Negative for visual disturbance.   Respiratory: Negative for shortness of breath.    Cardiovascular: Negative for chest pain.   Gastrointestinal: Negative for constipation.   Genitourinary: Negative for dysuria and hematuria.   Musculoskeletal: Negative for arthralgias and back pain.   Neurological: Negative for numbness and headaches.   Psychiatric/Behavioral: Negative for confusion and hallucinations.     Objective:     Vital Signs (Most Recent):  Temp: 97.4 °F (36.3 °C) (04/06/18 1119)  Pulse: 89 (04/06/18 1119)  Resp: 18 (04/06/18 1119)  BP: 117/73 (04/06/18 1119)  SpO2: 97 % (04/06/18 1119) Vital Signs (24h Range):  Temp:  [97.4 °F (36.3 °C)-98.5 °F (36.9 °C)] 97.4 °F (36.3 °C)  Pulse:  [] 89  Resp:  [16-18] 18  SpO2:  [93 %-98 %] 97 %  BP: (111-146)/(60-73) 117/73     Weight: 68 kg (150 lb)  Body mass index is 27.44 kg/m².    Intake/Output Summary (Last 24 hours) at 04/06/18 1445  Last data filed at 04/06/18 1148   Gross per 24 hour   Intake             1330 ml   Output                0 ml   Net             1330 ml      Physical Exam   Constitutional: She is oriented to person, place, and time. No distress.   HENT:   Mouth/Throat: Oropharynx is clear and moist.   Eyes: Conjunctivae and EOM are normal. No scleral icterus.   Neck: Normal range of motion. No JVD present.   Cardiovascular: Normal rate, regular rhythm and  normal heart sounds.  Exam reveals no gallop and no friction rub.    No murmur heard.  Pulmonary/Chest: Effort normal and breath sounds normal. She has no wheezes. She has no rales. She exhibits tenderness.   Abdominal: Soft. Bowel sounds are normal. She exhibits no distension. There is no tenderness.   Musculoskeletal: She exhibits no edema.   Neurological: She is alert and oriented to person, place, and time. No sensory deficit.   Skin: Skin is warm. She is not diaphoretic.   Multiple pustule lesions and small areas of scabbing on face, extremities and back. Face with scabbing prominent on chin (improving)   Psychiatric: She has a normal mood and affect.       Significant Labs:     Recent Results (from the past 24 hour(s))   Toxicology screen, urine    Collection Time: 04/05/18  6:57 PM   Result Value Ref Range    Alcohol, Urine <10 <10 mg/dL    Benzodiazepines Negative     Methadone metabolites Negative     Cocaine (Metab.) Negative     Opiate Scrn, Ur Presumptive Positive     Barbiturate Screen, Ur Negative     Amphetamine Screen, Ur Negative     THC Negative     Phencyclidine Negative     Creatinine, Random Ur 82.0 15.0 - 325.0 mg/dL    Toxicology Information SEE COMMENT    CBC auto differential    Collection Time: 04/06/18  3:20 AM   Result Value Ref Range    WBC 8.22 3.90 - 12.70 K/uL    RBC 3.52 (L) 4.00 - 5.40 M/uL    Hemoglobin 9.6 (L) 12.0 - 16.0 g/dL    Hematocrit 30.1 (L) 37.0 - 48.5 %    MCV 86 82 - 98 fL    MCH 27.3 27.0 - 31.0 pg    MCHC 31.9 (L) 32.0 - 36.0 g/dL    RDW 14.1 11.5 - 14.5 %    Platelets 276 150 - 350 K/uL    MPV 9.1 (L) 9.2 - 12.9 fL    Immature Granulocytes 0.5 0.0 - 0.5 %    Gran # (ANC) 5.7 1.8 - 7.7 K/uL    Immature Grans (Abs) 0.04 0.00 - 0.04 K/uL    Lymph # 1.6 1.0 - 4.8 K/uL    Mono # 0.7 0.3 - 1.0 K/uL    Eos # 0.1 0.0 - 0.5 K/uL    Baso # 0.03 0.00 - 0.20 K/uL    nRBC 0 0 /100 WBC    Gran% 69.8 38.0 - 73.0 %    Lymph% 19.8 18.0 - 48.0 %    Mono% 8.4 4.0 - 15.0 %     Eosinophil% 1.1 0.0 - 8.0 %    Basophil% 0.4 0.0 - 1.9 %    Differential Method Automated    VANCOMYCIN, TROUGH before 4th dose    Collection Time: 04/06/18  3:22 AM   Result Value Ref Range    Vancomycin-Trough 44.1 (HH) 10.0 - 22.0 ug/mL         Significant Imaging: I have reviewed and interpreted all pertinent imaging results/findings within the past 24 hours.

## 2018-04-06 NOTE — SIGNIFICANT EVENT
Initially called to room as patient reported wanting to leave AMA. Discussed with patient that we would prefer her to stay and complete treatment, but would require that Security search her room as she was noted to be altered in her room after being off the floor for 45 mins.  She stated that she would like to continue treatment but refused UDS, however was amenable to room search.  Room was searched and drug paraphernalia was found in patient's bag.  At that point, pt became combative and 3 security officers were necessary to restrain the patient.  Patient eventually calmed down and stating she was embarrassed for her actions and was scared because she thought we were going to call the police on her.  Explained to the patient that we cannot allow her to continue to use illicit drugs while we are treating her.  At that point, patient was willing to provide urine sample for UDS.  She is agreeable to staying and continuing treatment. Will continue to monitor.     Georgie Aceves, PGY1

## 2018-04-06 NOTE — PLAN OF CARE
AM SHIFT'S PLAN OF CARE INTERVENTIONS    PLAN OF CARE REVIEWED WITH PATIENT. PATIENT VERBALIZED UNDERSTANDING AND COMPLIANCE WITH PLAN OF CARE. EDUCATION PROVIDED. TIME GIVEN FOR ALL QUESTIONS, COMMENTS AND CONCERNS OF PATIENT.    PATIENT AMBULATED OFF UNIT FOR GREATER THAN 1 HOUR. MD NOTIFIED. PATIENT REFUSED TO ADHERE TO MD'S ORDER TO REMAIN ON UNIT. PATIENT STATED THAT SHE WOULD RATHER LEAVE AMA. PATIENT WAS GIVEN AMA FORM TO SIGN THEN SHE STATED THAT SHE PREFERS TO STAY AND THAT SHE WOULD ABIDE BY RULES. PATIENT THEN STATED THAT IF HER SIGNIFICANT OTHER IS DISCHARGED TODAY THEN SHE WOULD LEAVE AMA. UNIT DIRECTOR AND MD NOTIFIED OF PATIENT'S PLANS.     MD ARRIVED AT BEDSIDE AND EXPLAINED TO PATIENT THAT SHE HAS 1 LAST CHANCE TO FOLLOW ALL DIRECTIONS OR SHE WOULD BE DISCHARGE. PATIENT VERBALIZED UNDERSTANDING.     PATIENT REMAINED ON UNIT ASLEEP FOR THE REST OF THE SHIFT.

## 2018-04-06 NOTE — PROGRESS NOTES
Patient notified that urine drug screen and room search by security has been requested.  After much protest patient agreed to search.  During search paraphernalia was found and patient became aggressive towards staff and required security to physically restrain patient temporarily.  Staff was able to talk to patient and calm her down.  Paraphernalia was taken by security. Patient did submit urine specimen for UDS.  Security x3, Dr. Aceves, house supervisor, charge RN and multiple staff members were present/witnessed event.

## 2018-04-07 VITALS
SYSTOLIC BLOOD PRESSURE: 118 MMHG | OXYGEN SATURATION: 99 % | DIASTOLIC BLOOD PRESSURE: 80 MMHG | HEIGHT: 62 IN | WEIGHT: 150 LBS | BODY MASS INDEX: 27.6 KG/M2 | RESPIRATION RATE: 16 BRPM | TEMPERATURE: 99 F | HEART RATE: 92 BPM

## 2018-04-07 PROBLEM — T36.8X5A VANCOMYCIN-INDUCED NEPHROTOXICITY: Status: ACTIVE | Noted: 2018-04-07

## 2018-04-07 PROBLEM — N17.0 ACUTE RENAL FAILURE WITH TUBULAR NECROSIS: Status: ACTIVE | Noted: 2018-04-07

## 2018-04-07 PROBLEM — R78.81 MRSA BACTEREMIA: Status: ACTIVE | Noted: 2018-04-07

## 2018-04-07 PROBLEM — Z91.199 MEDICALLY NONCOMPLIANT: Status: ACTIVE | Noted: 2018-04-07

## 2018-04-07 PROBLEM — B95.5 STREPTOCOCCAL BACTEREMIA: Status: ACTIVE | Noted: 2018-03-25

## 2018-04-07 PROBLEM — N14.19 VANCOMYCIN-INDUCED NEPHROTOXICITY: Status: ACTIVE | Noted: 2018-04-07

## 2018-04-07 PROBLEM — B95.62 MRSA BACTEREMIA: Status: ACTIVE | Noted: 2018-04-07

## 2018-04-07 PROBLEM — N17.0 ATN (ACUTE TUBULAR NECROSIS): Status: ACTIVE | Noted: 2018-04-07

## 2018-04-07 LAB
ALBUMIN SERPL BCP-MCNC: 2.8 G/DL
ALP SERPL-CCNC: 47 U/L
ALT SERPL W/O P-5'-P-CCNC: 11 U/L
ANION GAP SERPL CALC-SCNC: 8 MMOL/L
AST SERPL-CCNC: 15 U/L
BASOPHILS # BLD AUTO: 0.04 K/UL
BASOPHILS NFR BLD: 0.7 %
BILIRUB SERPL-MCNC: 0.5 MG/DL
BUN SERPL-MCNC: 31 MG/DL
BUN SERPL-MCNC: 31 MG/DL
BUN SERPL-MCNC: 32 MG/DL
CALCIUM SERPL-MCNC: 8.9 MG/DL
CALCIUM SERPL-MCNC: 8.9 MG/DL
CALCIUM SERPL-MCNC: 9 MG/DL
CHLORIDE SERPL-SCNC: 106 MMOL/L
CO2 SERPL-SCNC: 23 MMOL/L
CO2 SERPL-SCNC: 25 MMOL/L
CO2 SERPL-SCNC: 25 MMOL/L
CREAT SERPL-MCNC: 2.4 MG/DL
DIFFERENTIAL METHOD: ABNORMAL
EOSINOPHIL # BLD AUTO: 0.1 K/UL
EOSINOPHIL NFR BLD: 1.4 %
ERYTHROCYTE [DISTWIDTH] IN BLOOD BY AUTOMATED COUNT: 13.9 %
EST. GFR  (AFRICAN AMERICAN): 29.9 ML/MIN/1.73 M^2
EST. GFR  (NON AFRICAN AMERICAN): 25.9 ML/MIN/1.73 M^2
GLUCOSE SERPL-MCNC: 115 MG/DL
GLUCOSE SERPL-MCNC: 87 MG/DL
GLUCOSE SERPL-MCNC: 87 MG/DL
HCT VFR BLD AUTO: 28.6 %
HGB BLD-MCNC: 9.3 G/DL
IMM GRANULOCYTES # BLD AUTO: 0.02 K/UL
IMM GRANULOCYTES NFR BLD AUTO: 0.4 %
LYMPHOCYTES # BLD AUTO: 1.4 K/UL
LYMPHOCYTES NFR BLD: 24.7 %
MAGNESIUM SERPL-MCNC: 2.7 MG/DL
MCH RBC QN AUTO: 27.8 PG
MCHC RBC AUTO-ENTMCNC: 32.5 G/DL
MCV RBC AUTO: 85 FL
MONOCYTES # BLD AUTO: 0.8 K/UL
MONOCYTES NFR BLD: 13.3 %
NEUTROPHILS # BLD AUTO: 3.3 K/UL
NEUTROPHILS NFR BLD: 59.5 %
NRBC BLD-RTO: 0 /100 WBC
PHOSPHATE SERPL-MCNC: 5.4 MG/DL
PLATELET # BLD AUTO: 227 K/UL
PMV BLD AUTO: 9.1 FL
POTASSIUM SERPL-SCNC: 4.3 MMOL/L
POTASSIUM SERPL-SCNC: 4.4 MMOL/L
POTASSIUM SERPL-SCNC: 4.4 MMOL/L
PROT SERPL-MCNC: 6.4 G/DL
RBC # BLD AUTO: 3.35 M/UL
SODIUM SERPL-SCNC: 137 MMOL/L
SODIUM SERPL-SCNC: 139 MMOL/L
SODIUM SERPL-SCNC: 139 MMOL/L
VANCOMYCIN SERPL-MCNC: 29.7 UG/ML
WBC # BLD AUTO: 5.62 K/UL

## 2018-04-07 PROCEDURE — 99233 SBSQ HOSP IP/OBS HIGH 50: CPT | Mod: ,,, | Performed by: HOSPITALIST

## 2018-04-07 PROCEDURE — 80048 BASIC METABOLIC PNL TOTAL CA: CPT

## 2018-04-07 PROCEDURE — 85025 COMPLETE CBC W/AUTO DIFF WBC: CPT

## 2018-04-07 PROCEDURE — 83735 ASSAY OF MAGNESIUM: CPT

## 2018-04-07 PROCEDURE — 80053 COMPREHEN METABOLIC PANEL: CPT

## 2018-04-07 PROCEDURE — 84100 ASSAY OF PHOSPHORUS: CPT

## 2018-04-07 PROCEDURE — 25000003 PHARM REV CODE 250: Performed by: STUDENT IN AN ORGANIZED HEALTH CARE EDUCATION/TRAINING PROGRAM

## 2018-04-07 PROCEDURE — 80202 ASSAY OF VANCOMYCIN: CPT

## 2018-04-07 RX ORDER — SODIUM CHLORIDE 9 MG/ML
INJECTION, SOLUTION INTRAVENOUS CONTINUOUS
Status: DISCONTINUED | OUTPATIENT
Start: 2018-04-07 | End: 2018-04-07 | Stop reason: HOSPADM

## 2018-04-07 RX ADMIN — SODIUM CHLORIDE 950 ML: 0.9 INJECTION, SOLUTION INTRAVENOUS at 08:04

## 2018-04-07 RX ADMIN — MUPIROCIN: 20 OINTMENT TOPICAL at 10:04

## 2018-04-07 NOTE — NURSING
MD notified that patient was visualized leaving floor from shower with backpack.  Patient is being paged throughout hospital to return to room, charge nurse notified, house supervisor notified.  Will continue to follow up.

## 2018-04-07 NOTE — DISCHARGE SUMMARY
Hospital Medicine  Discharge Summary      Patient Name: Kimberlee Ling  MRN:  [id number]  Hospital Medicine Team: [facility] HOSP MED 4 Viktoria Jo MD  Date of Admission:  3/25/2018     Date of Discharge:  04/07/2018  Length of Stay:  LOS: 13 days   Principal Problem:  Streptococcal bacteremia     Active Hospital Problems    Diagnosis  POA    *Streptococcal bacteremia [R78.81, B95.5]  Yes    Acute renal failure with tubular necrosis [N17.0]  No    Medically noncompliant [Z91.19]  Not Applicable    MRSA bacteremia [R78.81]  Yes    Active intravenous drug use [F19.90]  Yes    Anemia [D64.9]  Yes    Opioid use disorder, severe, dependence [F11.20]  Yes    Hepatitis C [B19.20]  Yes    Chest pain [R07.9]  Yes      Resolved Hospital Problems    Diagnosis Date Resolved POA   No resolved problems to display.          History of Present Illness:  33yoF with PMHx of reported Hep C and IVDU presents with 2 day hx of fevers with associated SOB, sharp reproducible chest pain radiating to back, myalgias, and one loose bowel movement. Patient claims she has had flu like symptoms before but this different. Patient claims her and her boyfriend live in a tent and he has similar symptoms. Patient says she has noticed areas of pus throughout her arms and has been picking at her face and says that seems infected. Patient denies nausea, vomiting, cough, or dysuria. Patient uses heroin daily and injects cocaine once weekly, last used heroin yesterday. Patient claims she learned she had Hep C from needle exchange clinic 6 mos ago and has never received treatment.      Hospital Course of Principle Problem Addressed:  MRSA Bacteremia, Strep Bacteremia  Ms. Kimberlee Ling is a 33 y.o. female Hep C and IVDA who was admitted for management of her chest pain.  She was found to have MRSA and Strep pyogenes bacteremia 2/2 IVDA.  LIZET was negative for vegetations.  She was started on IV Vanc with an end date of 4/9.  On 4/6, she was  found to have a random Vanc of 44.  BMP done at that time showed new onset ARF with ATN 2/2 Vanc induced Nephropathy.  She was started on IVF.  On the day she decided to leave AMA (4/7), her random Vanc was 29.  Due to her ARF, she will complete therapy for her bacteremia without the need for further dosing of Vanc.    Medically Noncompliant  Patient with multiple trips off the floor for more than an hour, and then returning to the floor altered.  Had an altercation with security after she was found to have drugs in her room and positive UDS.  Originally decided to leave AMA after she was told she wasn't able to leave the floor, then changed her mind and agreed to obey hospital policy.  Later on in the day, the boyfriend was accepted to LTAC.  She originally said she was going to leave AMA to go with him to LTAC, but then decided to stay to continue her course of therapy.  Continued to have multiple trips off the floor after specifically and repeatedly being told that she was not allowed to leave the unit.  She was found to leave the unit with her backup in hand on 4/7.  She was overhead paged and security was notified.  She was found by security and decided to leave AMA prior to being informed of the risks of leaving.  She reportedly left with her midline intact.  AMA paperwork completed.      Other Medical Problems Addressed in the Hospital:  Strep bacteremia, MRSA Bacteremia     - Patient with tachycardia and fever up to 103.5 in setting of IVDU also has multiple pustules on exam and facial lesion consistent with impetigo  - Procal 17.89, leukocytosis 13.71 on admission  - Secondary to Group A strep and MRSA bacteremia  - Currently CXR not consistent with PNA, influenza negative  - Given 30cc/kg zosyn and vanc in the ED  - Bactroban for impetigo to finish 4/2  - DC vanc 3/27 and continued rocephin; transitioned to IV PCN 3/28. Vanc restarted given new growth of MRSA  - TTE ordered, no vegetations,   - HIV testing  ordered, negative  - Blood Cx x 2 drawn in the Ed, 2/2 bottles growing strep pyogenes, repeated cultures 3/26 negative, gave IV PCN for one day, MRSA in cultures from 3/25 resulted 3/29, started IV vancomycin, end date April 9, 2018  - PICC consult for PICC. Patient previously refused 3/31, says she is amendable, re-consulted 4/2 for midline  - Patient previously notes wanting to leave AMA once significant other is relocated to LTAC.  Patient explained risks and benefits, but notes no change in mind.  If significant other has placement, will anticipate patient leaving AMA             Acute Renal Failure with ATN (acute tubular necrosis); Vanc induced nephrotoxicity     Likely acute interstitial injury due to vancomycin dosing  Unclear why vanc levels supratherapeutic, when they were steady state previously.   UTox screen positive for opioids- likely was using on previous excursions during hospital stay  Holding vanc until levels therapeutic and renal injury resolves  Continue IV maintenance fluids          Anemia     Hemodynamically stable  May be due to lab error  No evidence of GI bleeding.   Will repeat labs and continue to monitor.           Opioid use disorder, severe, dependence     Plans for rehab with [facility]  Discussed with psych.             Active intravenous drug use     Patient everyday heroin/fentanyl  Prn clonidine, loperamide, ibuprofen, and tizanidine for withdrawal symptoms  Consult to addiction psych who has started methadone with taper completed taper 3/29, plan for rehab patient amendable             Chest pain     Reproducible on exam  EKG without STEMI or depressions  No other cardiac risk factors other than IV cocaine use intermittently; however denies use the week of admission   MRI spine ordered 2/2 to recent MRSA isolated in blood from 3/25, negative for osteomyelitis, showed subsegmental opacities   CT chest ordered to further investigate 3/30 found scattered nodules, with one 2.3 x  1.9 with concern for possible abscess. CT repeated, nodules are unchanged in size. Likely MSK, no evidence of skeletal disease on imagine, prn toradol ordered for 3 days, improved today 3/31                Hepatitis C     Reported per patient who does not have medical care, patient discovered 6 mos ago from needle exchange service; not currently being treated  Hep C RNA + and viral rna detected  Hep B surface antibody positive, ordered Hep e testing and Hep B core total, negative  Will follow up with ID as outpatient.          Significant Diagnostic Tests/Imaging:      Recent Labs  Lab 04/05/18  0349 04/06/18  0320 04/07/18  0347   WBC 5.83 8.22 5.62   HGB 10.5* 9.6* 9.3*   HCT 31.2* 30.1* 28.6*    276 227       Recent Labs  Lab 04/04/18  0507 04/07/18  0347 04/07/18  1016    139  139 137   K 4.2 4.4  4.4 4.3    106  106 106   CO2 25 25  25 23   BUN 16 31*  31* 32*   CREATININE 0.7 2.4*  2.4* 2.4*   GLU 98 87  87 115*   CALCIUM 8.7 8.9  8.9 9.0   MG 2.0 2.7*  --    PHOS 4.4 5.4*  --        Recent Labs  Lab 04/04/18  0507 04/07/18 0347   ALKPHOS 46* 47*   ALT 10 11   AST 9* 15   ALBUMIN 2.9* 2.8*   PROT 6.5 6.4   BILITOT 0.2 0.5      No results for input(s): POCTGLUCOSE in the last 168 hours.  No results for input(s): CPK, CPKMB, MB, TROPONINI in the last 72 hours.    No results for input(s): LACTATE in the last 72 hours.       Significant Treatments/Procedures:  LIZET negative for endocarditis.  IV Vanc      Consultants:  Infectious Disease, Cardiology      Discharge Medications:    There are no discharge medications for this patient.      Discharge Diet:  regular diet     Activity: activity as tolerated    Discharge Condition: Serious    Disposition: Left Against Medical Advice     Time spent on the discharge of the patient including review of hospital course with the patient. reviewing discharge medications and arranging follow-up care 55 minutes.  Patient was seen and examined on the  date of discharge and determined to be suitable for discharge.    No future appointments.    Viktoria Jo MD  [facility] Medicine  Cell:  [phone]  Spectra:  [phone]  Pager:  [phone]

## 2018-04-07 NOTE — PROGRESS NOTES
I spoke with pt about expectations and notification if she had to leave floor for vending machine. Pt reminded of restrictions and that if she left the unit, she would possibly be discharged d/t previous agreement. Pt asked if there was any way she could go to smoke a cigarette, even with . Team stated if security is able to provide an escort for a max of 15 min then request is possible. I contacted security who stated they were unable to meet this request. Notified primary RN. Will follow and continue to monitor.

## 2018-04-07 NOTE — PROGRESS NOTES
Notified from primary RN that pt was not on the unit. Pt reported she was going to take a shower, seen going in to restroom, then witnessed by another nurse walking down the [place] with a backpack. Unit restrooms, discharge room, hallways, and pt room searched.    1551: Pt paged overhead on the unit. Primary team (Dr. Lopez) notified of above information.  1555: Pt paged overhead throughout the hospital.  1606: Security contacted and given above information.  1615: Security contacted and stated that pt was now in their security offc. I asked if she was willing to stay admitted and if not, she should come up to receive instruction from team. Security stated they would be escorting pt back to the unit.  1625: Security (Marcelo) came to unit and stated pt refused to come back neither did she want to come back and collect her belongings. Dr. Lopez on unit to complete AMA paperwork.    House supervisor (Fernando) and unit director (Ana) notified of pt leaving AMA, also c midline intact.

## 2018-04-07 NOTE — PROGRESS NOTES
Unit secretary visualized pt leaving floor approx 20 min ago. Currently, pt still not back on unit or in room. Notified primary RN, primary team being contacted.

## 2018-04-07 NOTE — NURSING
"Patient now back in room.  Pt states " I went to the 5th floor for some iced coffee".  Pt again instructed to not leave the floor per MD orders.  Pt is a,a,ox4 and no distress noted.  Will continue to monitor.   "

## 2018-04-07 NOTE — NURSING
Patient has been sleeping most of the shift, left unit one time at this time. She was medicated x1 for nausea, which appeared to be effective. Midline flushed without difficulty. VSS; will continue to monitor.

## 2018-04-07 NOTE — PROGRESS NOTES
[facility] Hospital Medicine  Progress Note    Patient Name: Kimberlee Ling  MRN: [id number]  Patient Class: IP- Inpatient   Admission Date: 3/25/2018  Length of Stay: 13 days  Attending Physician: Viktoria Jo MD  Primary Care Provider: Primary [facility] Medicine Team: [facility] HOSP MED 4 Austin Lopez MD    Subjective:     Principal Problem:Gram-positive bacteremia    HPI:  33yoF with PMHx of reported Hep C and IVDU presents with 2 day hx of fevers with associated SOB, sharp reproducible chest pain radiating to back, myalgias, and one loose bowel movement. Patient claims she has had flu like symptoms before but this different. Patient claims her and her boyfriend live in a tent and he has similar symptoms. Patient says she has noticed areas of pus throughout her arms and has been picking at her face and says that seems infected. Patient denies nausea, vomiting, cough, or dysuria. Patient uses heroin daily and injects cocaine once weekly, last used heroin yesterday. Patient claims she learned she had Hep C from needle exchange clinic 6 mos ago and has never received treatment.    Hospital Course:  Admitted to hospital medicine started on vanc and rocephin. 3/26 2/2 bottles growing strep. Obtained LIZET, no vegetation found 3/27. Patient transitioned to IV PCN for strep pyogenes 3/28; however grew MRSA from blood cultures from 3/25, repeated cultures 3/29 and started patient on vancomycin. Patient placed on methadone taper per addiction psych completed 3/29. Patient continues to have sharp chest and back pain on palpation and deep breathing. MRI spine ordered to investigate for osteomyelitis, unrevealing but for sugsegmental consolidation in lungs, ordered CT chest, found scattered nodules, with one 2.3 x 1.9 with concern for possible abscess. Will repeat CT near patient's discharge after abx completion. Patient difficulty with maintaining PIV, PICC consult for midline insertion  4/2.      Interval History: Patient not seen today because she was not in room. Stopped at room several times this morning, and she had left the floor, despite extensive conversations with her that she could not leave floor.     Review of Systems   Unable to perform ROS: Other     Objective:     Vital Signs (Most Recent):  Temp: 98.7 °F (37.1 °C) (04/07/18 1132)  Pulse: 92 (04/07/18 1132)  Resp: 16 (04/07/18 1132)  BP: 118/80 (04/07/18 1132)  SpO2: 99 % (04/07/18 1132) Vital Signs (24h Range):  Temp:  [97.7 °F (36.5 °C)-99.5 °F (37.5 °C)] 98.7 °F (37.1 °C)  Pulse:  [80-93] 92  Resp:  [15-20] 16  SpO2:  [96 %-99 %] 99 %  BP: (109-138)/(59-90) 118/80     Weight: 68 kg (150 lb)  Body mass index is 27.44 kg/m².    Intake/Output Summary (Last 24 hours) at 04/07/18 1249  Last data filed at 04/06/18 1441   Gross per 24 hour   Intake              480 ml   Output                0 ml   Net              480 ml      Physical Exam   Constitutional:   Patient not seen today because she was not in her room. She was seen ambulating by another member of team on the 5th floor, trying to use vending machine.        Significant Labs:   CBC:   Recent Labs  Lab 04/06/18  0320 04/07/18  0347   WBC 8.22 5.62   HGB 9.6* 9.3*   HCT 30.1* 28.6*    227     CMP:   Recent Labs  Lab 04/07/18  0347 04/07/18  1016     139 137   K 4.4  4.4 4.3     106 106   CO2 25  25 23   GLU 87  87 115*   BUN 31*  31* 32*   CREATININE 2.4*  2.4* 2.4*   CALCIUM 8.9  8.9 9.0   PROT 6.4  --    ALBUMIN 2.8*  --    BILITOT 0.5  --    ALKPHOS 47*  --    AST 15  --    ALT 11  --    ANIONGAP 8  8 8   EGFRNONAA 25.9*  25.9* 25.9*       Significant Imaging: I have reviewed and interpreted all pertinent imaging results/findings within the past 24 hours.    Assessment/Plan:      * Gram-positive bacteremia    - Patient with tachycardia and fever up to 103.5 in setting of IVDU also has multiple pustules on exam and facial lesion consistent with  impetigo  - Procal 17.89, leukocytosis 13.71 on admission  - Secondary to Group A strep and MRSA bacteremia  - Currently CXR not consistent with PNA, influenza negative  - Given 30cc/kg zosyn and vanc in the ED  - Bactroban for impetigo to finish 4/2  - DC vanc 3/27 and continued rocephin; transitioned to IV PCN 3/28. Vanc restarted given new growth of MRSA  - TTE ordered, no vegetations,   - HIV testing ordered, negative  - Blood Cx x 2 drawn in the Ed, 2/2 bottles growing strep pyogenes, repeated cultures 3/26 negative, gave IV PCN for one day, MRSA in cultures from 3/25 resulted 3/29, started IV vancomycin, end date April 9, 2018  - PICC consult for PICC. Patient previously refused 3/31, says she is amendable, re-consulted 4/2 for midline  - Patient previously notes wanting to leave AMA once significant other is relocated to LTAC.  Patient explained risks and benefits, but notes no change in mind.  If significant other has placement, will anticipate patient leaving AMA          ATN (acute tubular necrosis)    Likely acute interstitial injury due to vancomycin dosing  Unclear why vanc levels supratherapeutic, when they were steady state previously.   UTox screen positive for opioids- likely was using on previous excursions during hospital stay  Holding vanc until levels therapeutic and renal injury resolves  Continue IV maintenance fluids        Anemia    Hemodynamically stable  May be due to lab error  No evidence of GI bleeding.   Will repeat labs and continue to monitor.         Opioid use disorder, severe, dependence    Plans for rehab with [facility]  Discussed with psych.          Active intravenous drug use    Patient everyday heroin/fentanyl  Prn clonidine, loperamide, ibuprofen, and tizanidine for withdrawal symptoms  Consult to addiction psych who has started methadone with taper completed taper 3/29, plan for rehab patient amendable          Chest pain    Reproducible on exam  EKG without STEMI or  depressions  No other cardiac risk factors other than IV cocaine use intermittently; however denies use the week of admission   MRI spine ordered 2/2 to recent MRSA isolated in blood from 3/25, negative for osteomyelitis, showed subsegmental opacities   CT chest ordered to further investigate 3/30 found scattered nodules, with one 2.3 x 1.9 with concern for possible abscess. CT repeated, nodules are unchanged in size. Likely MSK, no evidence of skeletal disease on imagine, prn toradol ordered for 3 days, improved today 3/31            Hepatitis C    Reported per patient who does not have medical care, patient discovered 6 mos ago from needle exchange service; not currently being treated  Hep C RNA + and viral rna detected  Hep B surface antibody positive, ordered Hep e testing and Hep B core total, negative  Will follow up with ID as outpatient.             VTE Risk Mitigation         Ordered     IP VTE LOW RISK PATIENT  Once      03/25/18 1900              Austin Lopez MD  Department of Hospital Medicine   [facility]

## 2018-04-07 NOTE — SUBJECTIVE & OBJECTIVE
Interval History: Patient not seen today because she was not in room. Stopped at room several times this morning, and she had left the floor, despite extensive conversations with her that she could not leave floor.     Review of Systems   Unable to perform ROS: Other     Objective:     Vital Signs (Most Recent):  Temp: 98.7 °F (37.1 °C) (04/07/18 1132)  Pulse: 92 (04/07/18 1132)  Resp: 16 (04/07/18 1132)  BP: 118/80 (04/07/18 1132)  SpO2: 99 % (04/07/18 1132) Vital Signs (24h Range):  Temp:  [97.7 °F (36.5 °C)-99.5 °F (37.5 °C)] 98.7 °F (37.1 °C)  Pulse:  [80-93] 92  Resp:  [15-20] 16  SpO2:  [96 %-99 %] 99 %  BP: (109-138)/(59-90) 118/80     Weight: 68 kg (150 lb)  Body mass index is 27.44 kg/m².    Intake/Output Summary (Last 24 hours) at 04/07/18 1249  Last data filed at 04/06/18 1441   Gross per 24 hour   Intake              480 ml   Output                0 ml   Net              480 ml      Physical Exam   Constitutional:   Patient not seen today because she was not in her room. She was seen ambulating by another member of team on the 5th floor, trying to use vending machine.        Significant Labs:   CBC:   Recent Labs  Lab 04/06/18  0320 04/07/18  0347   WBC 8.22 5.62   HGB 9.6* 9.3*   HCT 30.1* 28.6*    227     CMP:   Recent Labs  Lab 04/07/18  0347 04/07/18  1016     139 137   K 4.4  4.4 4.3     106 106   CO2 25  25 23   GLU 87  87 115*   BUN 31*  31* 32*   CREATININE 2.4*  2.4* 2.4*   CALCIUM 8.9  8.9 9.0   PROT 6.4  --    ALBUMIN 2.8*  --    BILITOT 0.5  --    ALKPHOS 47*  --    AST 15  --    ALT 11  --    ANIONGAP 8  8 8   EGFRNONAA 25.9*  25.9* 25.9*       Significant Imaging: I have reviewed and interpreted all pertinent imaging results/findings within the past 24 hours.

## 2018-04-07 NOTE — NURSING
Primary physician notified of patient leaving floor at approximately 0900.  Patient was instructed not to leave the floor by nurse this AM.  No new orders given at this time.  Will continue to monitor.

## 2018-04-07 NOTE — ASSESSMENT & PLAN NOTE
Likely acute interstitial injury due to vancomycin dosing  Unclear why vanc levels supratherapeutic, when they were steady state previously.   UTox screen positive for opioids- likely was using on previous excursions during hospital stay  Holding vanc until levels therapeutic and renal injury resolves  Continue IV maintenance fluids

## 2018-04-07 NOTE — CARE UPDATE
Notified patient left unit multiple times throughout the day. Discussed with nursing staff, aware that patient cannot leave unit, instances have been documented.  At around 4pm, notified by DAO and charge that patient had left unit with her belongings. She was paged multiple times, found by security. Reportedly left without opportunity to discuss with her risks of leaving. She left with intact midline in place. AMA paperwork completed. See DAO Conway RN and Kaitlyn Farias RN documentation regarding specific timeline.     Austin Lopez MD  PGY1 Internal Medicine

## 2018-04-09 NOTE — PLAN OF CARE
Pt left AMA.     04/09/18 1310   Final Note   Assessment Type Final Discharge Note   Discharge Disposition Left Against   What phone number can be called within the next 1-3 days to see how you are doing after discharge? (no phone number)   Right Care Referral Info   Post Acute Recommendation No Care

## 2023-01-25 NOTE — ASSESSMENT & PLAN NOTE
Hemodynamically stable  May be due to lab error  No evidence of GI bleeding.   Will repeat labs and continue to monitor.    re
